# Patient Record
Sex: FEMALE | Race: BLACK OR AFRICAN AMERICAN | NOT HISPANIC OR LATINO | Employment: UNEMPLOYED | URBAN - METROPOLITAN AREA
[De-identification: names, ages, dates, MRNs, and addresses within clinical notes are randomized per-mention and may not be internally consistent; named-entity substitution may affect disease eponyms.]

---

## 2019-05-19 ENCOUNTER — HOSPITAL ENCOUNTER (INPATIENT)
Facility: HOSPITAL | Age: 39
LOS: 3 days | Discharge: HOME | DRG: 519 | End: 2019-05-22
Attending: EMERGENCY MEDICINE | Admitting: NEUROLOGICAL SURGERY
Payer: MEDICAID

## 2019-05-19 ENCOUNTER — ANESTHESIA (OUTPATIENT)
Dept: SURGERY | Facility: HOSPITAL | Age: 39
DRG: 519 | End: 2019-05-19
Payer: MEDICAID

## 2019-05-19 ENCOUNTER — ANESTHESIA EVENT (OUTPATIENT)
Dept: SURGERY | Facility: HOSPITAL | Age: 39
DRG: 519 | End: 2019-05-19
Payer: MEDICAID

## 2019-05-19 DIAGNOSIS — I95.9 HYPOTENSION, UNSPECIFIED HYPOTENSION TYPE: ICD-10-CM

## 2019-05-19 DIAGNOSIS — R94.4 KIDNEY FUNCTION TEST ABNORMAL: ICD-10-CM

## 2019-05-19 DIAGNOSIS — G83.4 CAUDA EQUINA SYNDROME: Primary | ICD-10-CM

## 2019-05-19 DIAGNOSIS — R33.9 URINARY RETENTION: ICD-10-CM

## 2019-05-19 LAB
ABO + RH BLD: NORMAL
ALBUMIN SERPL BCP-MCNC: 3.1 G/DL (ref 3.5–5.2)
ALP SERPL-CCNC: 45 U/L (ref 55–135)
ALT SERPL W/O P-5'-P-CCNC: 8 U/L (ref 10–44)
ANION GAP SERPL CALC-SCNC: 11 MMOL/L (ref 8–16)
APTT BLDCRRT: 23.6 SEC (ref 21–32)
AST SERPL-CCNC: 17 U/L (ref 10–40)
BASOPHILS # BLD AUTO: 0.01 K/UL (ref 0–0.2)
BASOPHILS NFR BLD: 0.2 % (ref 0–1.9)
BILIRUB SERPL-MCNC: 0.2 MG/DL (ref 0.1–1)
BLD GP AB SCN CELLS X3 SERPL QL: NORMAL
BUN SERPL-MCNC: 36 MG/DL (ref 6–20)
CALCIUM SERPL-MCNC: 9 MG/DL (ref 8.7–10.5)
CHLORIDE SERPL-SCNC: 106 MMOL/L (ref 95–110)
CO2 SERPL-SCNC: 21 MMOL/L (ref 23–29)
CREAT SERPL-MCNC: 2.6 MG/DL (ref 0.5–1.4)
DIFFERENTIAL METHOD: ABNORMAL
EOSINOPHIL # BLD AUTO: 0 K/UL (ref 0–0.5)
EOSINOPHIL NFR BLD: 0.2 % (ref 0–8)
ERYTHROCYTE [DISTWIDTH] IN BLOOD BY AUTOMATED COUNT: 15.1 % (ref 11.5–14.5)
EST. GFR  (AFRICAN AMERICAN): 26.2 ML/MIN/1.73 M^2
EST. GFR  (NON AFRICAN AMERICAN): 22.7 ML/MIN/1.73 M^2
GLUCOSE SERPL-MCNC: 112 MG/DL (ref 70–110)
HCT VFR BLD AUTO: 40.7 % (ref 37–48.5)
HGB BLD-MCNC: 13.3 G/DL (ref 12–16)
IMM GRANULOCYTES # BLD AUTO: 0.02 K/UL (ref 0–0.04)
IMM GRANULOCYTES NFR BLD AUTO: 0.3 % (ref 0–0.5)
INR PPP: 0.9 (ref 0.8–1.2)
LYMPHOCYTES # BLD AUTO: 0.6 K/UL (ref 1–4.8)
LYMPHOCYTES NFR BLD: 10 % (ref 18–48)
MCH RBC QN AUTO: 29.8 PG (ref 27–31)
MCHC RBC AUTO-ENTMCNC: 32.7 G/DL (ref 32–36)
MCV RBC AUTO: 91 FL (ref 82–98)
MONOCYTES # BLD AUTO: 0.1 K/UL (ref 0.3–1)
MONOCYTES NFR BLD: 1.4 % (ref 4–15)
NEUTROPHILS # BLD AUTO: 5.5 K/UL (ref 1.8–7.7)
NEUTROPHILS NFR BLD: 87.9 % (ref 38–73)
NRBC BLD-RTO: 0 /100 WBC
PLATELET # BLD AUTO: 227 K/UL (ref 150–350)
PMV BLD AUTO: 10.9 FL (ref 9.2–12.9)
POTASSIUM SERPL-SCNC: 4.4 MMOL/L (ref 3.5–5.1)
PROT SERPL-MCNC: 7 G/DL (ref 6–8.4)
PROTHROMBIN TIME: 9.9 SEC (ref 9–12.5)
RBC # BLD AUTO: 4.46 M/UL (ref 4–5.4)
SODIUM SERPL-SCNC: 138 MMOL/L (ref 136–145)
WBC # BLD AUTO: 6.28 K/UL (ref 3.9–12.7)

## 2019-05-19 PROCEDURE — 71000033 HC RECOVERY, INTIAL HOUR: Performed by: NEUROLOGICAL SURGERY

## 2019-05-19 PROCEDURE — 86901 BLOOD TYPING SEROLOGIC RH(D): CPT

## 2019-05-19 PROCEDURE — 85025 COMPLETE CBC W/AUTO DIFF WBC: CPT

## 2019-05-19 PROCEDURE — 85610 PROTHROMBIN TIME: CPT

## 2019-05-19 PROCEDURE — 36000711: Performed by: NEUROLOGICAL SURGERY

## 2019-05-19 PROCEDURE — 99222 PR INITIAL HOSPITAL CARE,LEVL II: ICD-10-PCS | Mod: 57,,, | Performed by: NEUROLOGICAL SURGERY

## 2019-05-19 PROCEDURE — 99284 EMERGENCY DEPT VISIT MOD MDM: CPT | Mod: ,,, | Performed by: EMERGENCY MEDICINE

## 2019-05-19 PROCEDURE — 99284 PR EMERGENCY DEPT VISIT,LEVEL IV: ICD-10-PCS | Mod: ,,, | Performed by: EMERGENCY MEDICINE

## 2019-05-19 PROCEDURE — 12000002 HC ACUTE/MED SURGE SEMI-PRIVATE ROOM

## 2019-05-19 PROCEDURE — C1729 CATH, DRAINAGE: HCPCS | Performed by: NEUROLOGICAL SURGERY

## 2019-05-19 PROCEDURE — 37000008 HC ANESTHESIA 1ST 15 MINUTES: Performed by: NEUROLOGICAL SURGERY

## 2019-05-19 PROCEDURE — 63030 PR LAMINOTOMY,LUMBAR DISK,1 INTRSP: ICD-10-PCS | Mod: 22,RT,, | Performed by: NEUROLOGICAL SURGERY

## 2019-05-19 PROCEDURE — 36000710: Performed by: NEUROLOGICAL SURGERY

## 2019-05-19 PROCEDURE — D9220A PRA ANESTHESIA: Mod: CRNA,,, | Performed by: NURSE ANESTHETIST, CERTIFIED REGISTERED

## 2019-05-19 PROCEDURE — 25000003 PHARM REV CODE 250: Performed by: NURSE ANESTHETIST, CERTIFIED REGISTERED

## 2019-05-19 PROCEDURE — D9220A PRA ANESTHESIA: Mod: ANES,,, | Performed by: ANESTHESIOLOGY

## 2019-05-19 PROCEDURE — 63600175 PHARM REV CODE 636 W HCPCS: Performed by: NURSE ANESTHETIST, CERTIFIED REGISTERED

## 2019-05-19 PROCEDURE — D9220A PRA ANESTHESIA: ICD-10-PCS | Mod: CRNA,,, | Performed by: NURSE ANESTHETIST, CERTIFIED REGISTERED

## 2019-05-19 PROCEDURE — 96374 THER/PROPH/DIAG INJ IV PUSH: CPT

## 2019-05-19 PROCEDURE — 99222 1ST HOSP IP/OBS MODERATE 55: CPT | Mod: 57,,, | Performed by: NEUROLOGICAL SURGERY

## 2019-05-19 PROCEDURE — 99285 EMERGENCY DEPT VISIT HI MDM: CPT

## 2019-05-19 PROCEDURE — C1762 CONN TISS, HUMAN(INC FASCIA): HCPCS | Performed by: NEUROLOGICAL SURGERY

## 2019-05-19 PROCEDURE — 63600175 PHARM REV CODE 636 W HCPCS: Performed by: STUDENT IN AN ORGANIZED HEALTH CARE EDUCATION/TRAINING PROGRAM

## 2019-05-19 PROCEDURE — 25000003 PHARM REV CODE 250: Performed by: NEUROLOGICAL SURGERY

## 2019-05-19 PROCEDURE — 37000009 HC ANESTHESIA EA ADD 15 MINS: Performed by: NEUROLOGICAL SURGERY

## 2019-05-19 PROCEDURE — 71000039 HC RECOVERY, EACH ADD'L HOUR: Performed by: NEUROLOGICAL SURGERY

## 2019-05-19 PROCEDURE — 63600175 PHARM REV CODE 636 W HCPCS

## 2019-05-19 PROCEDURE — D9220A PRA ANESTHESIA: ICD-10-PCS | Mod: ANES,,, | Performed by: ANESTHESIOLOGY

## 2019-05-19 PROCEDURE — 27201423 OPTIME MED/SURG SUP & DEVICES STERILE SUPPLY: Performed by: NEUROLOGICAL SURGERY

## 2019-05-19 PROCEDURE — 85730 THROMBOPLASTIN TIME PARTIAL: CPT

## 2019-05-19 PROCEDURE — 63600175 PHARM REV CODE 636 W HCPCS: Performed by: NEUROLOGICAL SURGERY

## 2019-05-19 PROCEDURE — 80053 COMPREHEN METABOLIC PANEL: CPT

## 2019-05-19 PROCEDURE — 25000003 PHARM REV CODE 250: Performed by: STUDENT IN AN ORGANIZED HEALTH CARE EDUCATION/TRAINING PROGRAM

## 2019-05-19 PROCEDURE — 63030 LAMOT DCMPRN NRV RT 1 LMBR: CPT | Mod: 22,RT,, | Performed by: NEUROLOGICAL SURGERY

## 2019-05-19 DEVICE — DURA MATRIX ONLAY PLUS 2X2: Type: IMPLANTABLE DEVICE | Site: BACK | Status: FUNCTIONAL

## 2019-05-19 RX ORDER — BISACODYL 10 MG
10 SUPPOSITORY, RECTAL RECTAL DAILY
Status: DISCONTINUED | OUTPATIENT
Start: 2019-05-20 | End: 2019-05-22 | Stop reason: HOSPADM

## 2019-05-19 RX ORDER — DIAZEPAM 5 MG/1
5 TABLET ORAL EVERY 6 HOURS PRN
Status: DISCONTINUED | OUTPATIENT
Start: 2019-05-20 | End: 2019-05-22 | Stop reason: HOSPADM

## 2019-05-19 RX ORDER — MORPHINE SULFATE 2 MG/ML
2 INJECTION, SOLUTION INTRAMUSCULAR; INTRAVENOUS ONCE
Status: COMPLETED | OUTPATIENT
Start: 2019-05-19 | End: 2019-05-19

## 2019-05-19 RX ORDER — FENTANYL CITRATE 50 UG/ML
INJECTION, SOLUTION INTRAMUSCULAR; INTRAVENOUS
Status: DISCONTINUED | OUTPATIENT
Start: 2019-05-19 | End: 2019-05-19

## 2019-05-19 RX ORDER — PROPOFOL 10 MG/ML
VIAL (ML) INTRAVENOUS
Status: DISCONTINUED | OUTPATIENT
Start: 2019-05-19 | End: 2019-05-19

## 2019-05-19 RX ORDER — LIDOCAINE HCL/PF 100 MG/5ML
SYRINGE (ML) INTRAVENOUS
Status: DISCONTINUED | OUTPATIENT
Start: 2019-05-19 | End: 2019-05-19

## 2019-05-19 RX ORDER — LIDOCAINE HYDROCHLORIDE AND EPINEPHRINE 10; 10 MG/ML; UG/ML
INJECTION, SOLUTION INFILTRATION; PERINEURAL
Status: DISCONTINUED | OUTPATIENT
Start: 2019-05-19 | End: 2019-05-19 | Stop reason: HOSPADM

## 2019-05-19 RX ORDER — BACITRACIN ZINC 500 UNIT/G
OINTMENT (GRAM) TOPICAL
Status: DISCONTINUED | OUTPATIENT
Start: 2019-05-19 | End: 2019-05-19 | Stop reason: HOSPADM

## 2019-05-19 RX ORDER — SUCCINYLCHOLINE CHLORIDE 20 MG/ML
INJECTION INTRAMUSCULAR; INTRAVENOUS
Status: DISCONTINUED | OUTPATIENT
Start: 2019-05-19 | End: 2019-05-19

## 2019-05-19 RX ORDER — BACITRACIN 50000 [IU]/1
INJECTION, POWDER, FOR SOLUTION INTRAMUSCULAR
Status: DISCONTINUED | OUTPATIENT
Start: 2019-05-19 | End: 2019-05-19 | Stop reason: HOSPADM

## 2019-05-19 RX ORDER — METHYLPREDNISOLONE 4 MG/1
4 TABLET ORAL
COMMUNITY
End: 2020-07-30 | Stop reason: CLARIF

## 2019-05-19 RX ORDER — NAPROXEN 500 MG/1
500 TABLET ORAL 2 TIMES DAILY
COMMUNITY
End: 2020-07-30 | Stop reason: CLARIF

## 2019-05-19 RX ORDER — INSULIN ASPART 100 [IU]/ML
1-10 INJECTION, SOLUTION INTRAVENOUS; SUBCUTANEOUS
Status: DISCONTINUED | OUTPATIENT
Start: 2019-05-19 | End: 2019-05-22 | Stop reason: HOSPADM

## 2019-05-19 RX ORDER — HYDROMORPHONE HYDROCHLORIDE 1 MG/ML
0.5 INJECTION, SOLUTION INTRAMUSCULAR; INTRAVENOUS; SUBCUTANEOUS EVERY 5 MIN PRN
Status: DISCONTINUED | OUTPATIENT
Start: 2019-05-19 | End: 2019-05-20 | Stop reason: HOSPADM

## 2019-05-19 RX ORDER — ONDANSETRON 2 MG/ML
4 INJECTION INTRAMUSCULAR; INTRAVENOUS DAILY PRN
Status: DISCONTINUED | OUTPATIENT
Start: 2019-05-19 | End: 2019-05-20 | Stop reason: HOSPADM

## 2019-05-19 RX ORDER — ONDANSETRON 2 MG/ML
INJECTION INTRAMUSCULAR; INTRAVENOUS
Status: DISCONTINUED | OUTPATIENT
Start: 2019-05-19 | End: 2019-05-19

## 2019-05-19 RX ORDER — HYDROCODONE BITARTRATE AND ACETAMINOPHEN 10; 325 MG/1; MG/1
1 TABLET ORAL EVERY 4 HOURS PRN
Status: DISCONTINUED | OUTPATIENT
Start: 2019-05-20 | End: 2019-05-22 | Stop reason: HOSPADM

## 2019-05-19 RX ORDER — HYDROCODONE BITARTRATE AND ACETAMINOPHEN 7.5; 325 MG/1; MG/1
1 TABLET ORAL EVERY 6 HOURS PRN
Status: ON HOLD | COMMUNITY
End: 2019-05-22 | Stop reason: HOSPADM

## 2019-05-19 RX ORDER — IBUPROFEN 200 MG
24 TABLET ORAL
Status: DISCONTINUED | OUTPATIENT
Start: 2019-05-19 | End: 2019-05-22 | Stop reason: HOSPADM

## 2019-05-19 RX ORDER — VANCOMYCIN HYDROCHLORIDE 1 G/20ML
INJECTION, POWDER, LYOPHILIZED, FOR SOLUTION INTRAVENOUS
Status: DISCONTINUED | OUTPATIENT
Start: 2019-05-19 | End: 2019-05-19 | Stop reason: HOSPADM

## 2019-05-19 RX ORDER — MELOXICAM 15 MG/1
15 TABLET ORAL DAILY
COMMUNITY
End: 2020-07-30 | Stop reason: CLARIF

## 2019-05-19 RX ORDER — MIDAZOLAM HYDROCHLORIDE 1 MG/ML
INJECTION, SOLUTION INTRAMUSCULAR; INTRAVENOUS
Status: DISCONTINUED | OUTPATIENT
Start: 2019-05-19 | End: 2019-05-19

## 2019-05-19 RX ORDER — HYDROMORPHONE HYDROCHLORIDE 1 MG/ML
0.2 INJECTION, SOLUTION INTRAMUSCULAR; INTRAVENOUS; SUBCUTANEOUS EVERY 5 MIN PRN
Status: DISCONTINUED | OUTPATIENT
Start: 2019-05-19 | End: 2019-05-20 | Stop reason: HOSPADM

## 2019-05-19 RX ORDER — ACETAMINOPHEN 10 MG/ML
INJECTION, SOLUTION INTRAVENOUS
Status: DISCONTINUED | OUTPATIENT
Start: 2019-05-19 | End: 2019-05-19

## 2019-05-19 RX ORDER — HEPARIN SODIUM 5000 [USP'U]/ML
5000 INJECTION, SOLUTION INTRAVENOUS; SUBCUTANEOUS EVERY 8 HOURS
Status: DISCONTINUED | OUTPATIENT
Start: 2019-05-20 | End: 2019-05-22 | Stop reason: HOSPADM

## 2019-05-19 RX ORDER — CYCLOBENZAPRINE HCL 10 MG
10 TABLET ORAL 3 TIMES DAILY PRN
COMMUNITY
End: 2020-07-30 | Stop reason: CLARIF

## 2019-05-19 RX ORDER — ROCURONIUM BROMIDE 10 MG/ML
INJECTION, SOLUTION INTRAVENOUS
Status: DISCONTINUED | OUTPATIENT
Start: 2019-05-19 | End: 2019-05-19

## 2019-05-19 RX ORDER — CEFAZOLIN SODIUM 1 G/3ML
2 INJECTION, POWDER, FOR SOLUTION INTRAMUSCULAR; INTRAVENOUS
Status: COMPLETED | OUTPATIENT
Start: 2019-05-20 | End: 2019-05-20

## 2019-05-19 RX ORDER — IBUPROFEN 200 MG
16 TABLET ORAL
Status: DISCONTINUED | OUTPATIENT
Start: 2019-05-19 | End: 2019-05-22 | Stop reason: HOSPADM

## 2019-05-19 RX ORDER — GLUCAGON 1 MG
1 KIT INJECTION
Status: DISCONTINUED | OUTPATIENT
Start: 2019-05-19 | End: 2019-05-22 | Stop reason: HOSPADM

## 2019-05-19 RX ORDER — TAMSULOSIN HYDROCHLORIDE 0.4 MG/1
0.4 CAPSULE ORAL DAILY
Status: DISCONTINUED | OUTPATIENT
Start: 2019-05-20 | End: 2019-05-22 | Stop reason: HOSPADM

## 2019-05-19 RX ORDER — SODIUM CHLORIDE 0.9 % (FLUSH) 0.9 %
3 SYRINGE (ML) INJECTION
Status: DISCONTINUED | OUTPATIENT
Start: 2019-05-19 | End: 2019-05-22 | Stop reason: HOSPADM

## 2019-05-19 RX ORDER — MAG HYDROX/ALUMINUM HYD/SIMETH 200-200-20
30 SUSPENSION, ORAL (FINAL DOSE FORM) ORAL EVERY 4 HOURS PRN
Status: DISCONTINUED | OUTPATIENT
Start: 2019-05-20 | End: 2019-05-22 | Stop reason: HOSPADM

## 2019-05-19 RX ORDER — HYDROMORPHONE HYDROCHLORIDE 1 MG/ML
INJECTION, SOLUTION INTRAMUSCULAR; INTRAVENOUS; SUBCUTANEOUS
Status: COMPLETED
Start: 2019-05-19 | End: 2019-05-19

## 2019-05-19 RX ORDER — DEXAMETHASONE SODIUM PHOSPHATE 4 MG/ML
INJECTION, SOLUTION INTRA-ARTICULAR; INTRALESIONAL; INTRAMUSCULAR; INTRAVENOUS; SOFT TISSUE
Status: DISCONTINUED | OUTPATIENT
Start: 2019-05-19 | End: 2019-05-19

## 2019-05-19 RX ORDER — AMOXICILLIN 250 MG
2 CAPSULE ORAL NIGHTLY PRN
Status: DISCONTINUED | OUTPATIENT
Start: 2019-05-20 | End: 2019-05-22 | Stop reason: HOSPADM

## 2019-05-19 RX ORDER — MORPHINE SULFATE 2 MG/ML
2 INJECTION, SOLUTION INTRAMUSCULAR; INTRAVENOUS
Status: DISCONTINUED | OUTPATIENT
Start: 2019-05-20 | End: 2019-05-22 | Stop reason: HOSPADM

## 2019-05-19 RX ORDER — ACETAMINOPHEN 325 MG/1
650 TABLET ORAL EVERY 6 HOURS PRN
Status: DISCONTINUED | OUTPATIENT
Start: 2019-05-20 | End: 2019-05-22 | Stop reason: HOSPADM

## 2019-05-19 RX ORDER — MUPIROCIN 20 MG/G
1 OINTMENT TOPICAL 2 TIMES DAILY
Status: DISCONTINUED | OUTPATIENT
Start: 2019-05-20 | End: 2019-05-22 | Stop reason: HOSPADM

## 2019-05-19 RX ORDER — ONDANSETRON 8 MG/1
8 TABLET, ORALLY DISINTEGRATING ORAL EVERY 6 HOURS PRN
Status: DISCONTINUED | OUTPATIENT
Start: 2019-05-20 | End: 2019-05-22 | Stop reason: HOSPADM

## 2019-05-19 RX ORDER — KETAMINE HYDROCHLORIDE 10 MG/ML
INJECTION, SOLUTION INTRAMUSCULAR; INTRAVENOUS
Status: DISCONTINUED | OUTPATIENT
Start: 2019-05-19 | End: 2019-05-19

## 2019-05-19 RX ADMIN — DIAZEPAM 5 MG: 5 TABLET ORAL at 11:05

## 2019-05-19 RX ADMIN — SODIUM CHLORIDE, SODIUM GLUCONATE, SODIUM ACETATE, POTASSIUM CHLORIDE, MAGNESIUM CHLORIDE, SODIUM PHOSPHATE, DIBASIC, AND POTASSIUM PHOSPHATE: .53; .5; .37; .037; .03; .012; .00082 INJECTION, SOLUTION INTRAVENOUS at 09:05

## 2019-05-19 RX ADMIN — FENTANYL CITRATE 50 MCG: 50 INJECTION, SOLUTION INTRAMUSCULAR; INTRAVENOUS at 09:05

## 2019-05-19 RX ADMIN — PROPOFOL 180 MG: 10 INJECTION, EMULSION INTRAVENOUS at 07:05

## 2019-05-19 RX ADMIN — LIDOCAINE HYDROCHLORIDE 100 MG: 20 INJECTION, SOLUTION INTRAVENOUS at 07:05

## 2019-05-19 RX ADMIN — CEFTRIAXONE 2 G: 1 INJECTION, SOLUTION INTRAVENOUS at 08:05

## 2019-05-19 RX ADMIN — ONDANSETRON 4 MG: 2 INJECTION INTRAMUSCULAR; INTRAVENOUS at 11:05

## 2019-05-19 RX ADMIN — HYDROMORPHONE HYDROCHLORIDE 0.2 MG: 1 INJECTION, SOLUTION INTRAMUSCULAR; INTRAVENOUS; SUBCUTANEOUS at 11:05

## 2019-05-19 RX ADMIN — KETAMINE HYDROCHLORIDE 25 MG: 10 INJECTION, SOLUTION INTRAMUSCULAR; INTRAVENOUS at 07:05

## 2019-05-19 RX ADMIN — MIDAZOLAM HYDROCHLORIDE 2 MG: 1 INJECTION, SOLUTION INTRAMUSCULAR; INTRAVENOUS at 07:05

## 2019-05-19 RX ADMIN — KETAMINE HYDROCHLORIDE 15 MG: 10 INJECTION, SOLUTION INTRAMUSCULAR; INTRAVENOUS at 08:05

## 2019-05-19 RX ADMIN — HYDROCODONE BITARTRATE AND ACETAMINOPHEN 1 TABLET: 10; 325 TABLET ORAL at 11:05

## 2019-05-19 RX ADMIN — FENTANYL CITRATE 50 MCG: 50 INJECTION, SOLUTION INTRAMUSCULAR; INTRAVENOUS at 11:05

## 2019-05-19 RX ADMIN — SUCCINYLCHOLINE CHLORIDE 120 MG: 20 INJECTION, SOLUTION INTRAMUSCULAR; INTRAVENOUS at 07:05

## 2019-05-19 RX ADMIN — METHOCARBAMOL 500 MG: 100 INJECTION INTRAMUSCULAR; INTRAVENOUS at 08:05

## 2019-05-19 RX ADMIN — MORPHINE SULFATE 2 MG: 2 INJECTION, SOLUTION INTRAMUSCULAR; INTRAVENOUS at 06:05

## 2019-05-19 RX ADMIN — ACETAMINOPHEN 1000 MG: 10 INJECTION, SOLUTION INTRAVENOUS at 08:05

## 2019-05-19 RX ADMIN — ROCURONIUM BROMIDE 5 MG: 10 INJECTION, SOLUTION INTRAVENOUS at 07:05

## 2019-05-19 RX ADMIN — FENTANYL CITRATE 50 MCG: 50 INJECTION, SOLUTION INTRAMUSCULAR; INTRAVENOUS at 08:05

## 2019-05-19 RX ADMIN — DEXAMETHASONE SODIUM PHOSPHATE 12 MG: 4 INJECTION, SOLUTION INTRAMUSCULAR; INTRAVENOUS at 07:05

## 2019-05-19 RX ADMIN — KETAMINE HYDROCHLORIDE 10 MG: 10 INJECTION, SOLUTION INTRAMUSCULAR; INTRAVENOUS at 09:05

## 2019-05-19 RX ADMIN — SODIUM CHLORIDE, SODIUM GLUCONATE, SODIUM ACETATE, POTASSIUM CHLORIDE, MAGNESIUM CHLORIDE, SODIUM PHOSPHATE, DIBASIC, AND POTASSIUM PHOSPHATE: .53; .5; .37; .037; .03; .012; .00082 INJECTION, SOLUTION INTRAVENOUS at 07:05

## 2019-05-19 RX ADMIN — PROPOFOL 20 MG: 10 INJECTION, EMULSION INTRAVENOUS at 07:05

## 2019-05-19 RX ADMIN — FENTANYL CITRATE 100 MCG: 50 INJECTION, SOLUTION INTRAMUSCULAR; INTRAVENOUS at 07:05

## 2019-05-19 NOTE — ED NOTES
"Pt informed of need for rectal exam. Rectal exam performed by Dr. Velásquez. Decreased rectal tone noted. Pt was unaware of rectal exam completion. Informed pt, exam is finished. Pt states " I didn't even feel it."   "

## 2019-05-19 NOTE — ANESTHESIA PREPROCEDURE EVALUATION
[facility]  Anesthesia Pre-Operative Evaluation         Patient Name: Leti Babb  YOB: 1980  MRN: [id number]    SUBJECTIVE:     Pre-operative evaluation for Procedure(s) (LRB):  LAMINECTOMY, SPINE, LUMBAR, WITH FUSION (L5/S1) MIS laminectomy Microscope, metrix set (do not open) Class A (N/A)     05/19/2019    Leti Babb is a 39 y.o. female w/ significant PMHx of tobacco abuse, presented with saddle anesthesia after fall. CT of lumbar spine showing left central disprotrusion of L5-S1. Last ate at noon, had shrimp etoufee.    Patient now presents for the above procedure(s).      LDA:   22 g Lt AC      Prev airway: None documented    Drips: None documented.      There is no problem list on file for this patient.      Review of patient's allergies indicates:  No Known Allergies    Current Inpatient Medications:   methocarbamol (ROBAXIN) IVPB  500 mg Intravenous Once       No current facility-administered medications on file prior to encounter.      Current Outpatient Medications on File Prior to Encounter   Medication Sig Dispense Refill    cyclobenzaprine (FLEXERIL) 10 MG tablet Take 10 mg by mouth 3 (three) times daily as needed for Muscle spasms.      HYDROcodone-acetaminophen (NORCO) 7.5-325 mg per tablet Take 1 tablet by mouth every 6 (six) hours as needed for Pain.      meloxicam (MOBIC) 15 MG tablet Take 15 mg by mouth once daily.      methylPREDNISolone (MEDROL DOSEPACK) 4 mg tablet Take 4 mg by mouth. use as directed      naproxen (NAPROSYN) 500 MG tablet Take 500 mg by mouth 2 (two) times daily.         History reviewed. No pertinent surgical history.    Social History     Socioeconomic History    Marital status: Single     Spouse name: Not on file    Number of children: Not on file    Years of education: Not on file    Highest education level: Not on file   Occupational History    Not on file   Social Needs    Financial resource strain: Not on file     Food insecurity:     Worry: Not on file     Inability: Not on file    Transportation needs:     Medical: Not on file     Non-medical: Not on file   Tobacco Use    Smoking status: Current Every Day Smoker     Packs/day: 0.50     Types: Cigarettes   Substance and Sexual Activity    Alcohol use: Not Currently    Drug use: Not on file    Sexual activity: Not on file   Lifestyle    Physical activity:     Days per week: Not on file     Minutes per session: Not on file    Stress: Not on file   Relationships    Social connections:     Talks on phone: Not on file     Gets together: Not on file     Attends [employer] service: Not on file     Active member of club or organization: Not on file     Attends meetings of clubs or organizations: Not on file     Relationship status: Not on file   Other Topics Concern    Not on file   Social History Narrative    Not on file       OBJECTIVE:     Vital Signs Range (Last 24H):  Temp:  [36.7 °C (98.1 °F)-36.8 °C (98.2 °F)]   Pulse:  [72-86]   Resp:  [14-16]   BP: (107-136)/(51-63)   SpO2:  [99 %-100 %]       Significant Labs:  Lab Results   Component Value Date    WBC 6.28 05/19/2019    HGB 13.3 05/19/2019    HCT 40.7 05/19/2019     05/19/2019    INR 0.9 05/19/2019       Diagnostic Studies:   MRI spine   Impression       1. Large disc protrusion at L5-S1 resulting in severe spinal canal stenosis.  Correlation is advised noting there is limited evaluation secondary to motion artifact.  2. Additional spinal degenerative changes as described above.         EKG: No recent studies available.    2D ECHO:  No results found for this or any previous visit.      ASSESSMENT/PLAN:         Anesthesia Evaluation    I have reviewed the Patient Summary Reports.    I have reviewed the Nursing Notes.   I have reviewed the Medications.     Review of Systems  Anesthesia Hx:  Neg history of prior surgery. Denies Family Hx of Anesthesia complications.   Denies Personal Hx of Anesthesia  complications.   Social:  Smoker    Cardiovascular:   Denies MI.  Denies CAD.    Denies CABG/stent.  Denies Dysrhythmias.   Denies Angina.    Pulmonary:   Denies Pneumonia Denies COPD.  Denies Asthma.  Denies Shortness of breath.    Renal/:   Denies Chronic Renal Disease.     Hepatic/GI:   Denies Liver Disease.    Neurological:   Denies CVA. Denies Seizures. Cauda equina       Physical Exam  General:  Well nourished    Airway/Jaw/Neck:  Airway Findings: Mouth Opening: Normal Tongue: Normal  Mallampati: II  Improves to I with phonation.  TM Distance: Normal, at least 6 cm        Eyes/Ears/Nose:  EYES/EARS/NOSE FINDINGS: Normal   Dental:  DENTAL FINDINGS: Normal   Chest/Lungs:  Chest/Lungs Findings: Clear to auscultation, Normal Respiratory Rate     Heart/Vascular:  Heart Findings: Rate: Normal  Rhythm: Regular Rhythm  Sounds: Normal     Abdomen:  Abdomen Findings: Normal    Musculoskeletal:  Musculoskeletal Findings: Normal   Skin:  Skin Findings: Normal    Mental Status:  Mental Status Findings: Normal        Anesthesia Plan  Type of Anesthesia, risks & benefits discussed:  Anesthesia Type:  general  Patient's Preference:   Intra-op Monitoring Plan: standard ASA monitors  Intra-op Monitoring Plan Comments:   Post Op Pain Control Plan: per primary service following discharge from PACU  Post Op Pain Control Plan Comments:   Induction:   IV  Beta Blocker:  Patient is not currently on a Beta-Blocker (No further documentation required).       Informed Consent: Patient understands risks and agrees with Anesthesia plan.  Questions answered. Anesthesia consent signed with patient.  ASA Score: 3  emergent   Day of Surgery Review of History & Physical: I have interviewed and examined the patient. I have reviewed the patient's H&P dated:  There are no significant changes.  H&P update referred to the surgeon.         Ready For Surgery From Anesthesia Perspective.

## 2019-05-19 NOTE — ED TRIAGE NOTES
"Pt arrived via [facility] from [facility]. Pt was sent here for neuro eval. Pt slipped and fell on soap in bathroom last Thursday. Pt has L5-S1 disc protrusion. Pt has not had bowel movement in 2 weeks. Pt states I am not regular. Last night patient had trouble urinating. Pt was found to have urinary retention. Nash placed at [facility]. Pt states " I couldn't feel it when they put it in or right now. I'm numb down there." Denies pain at this time. Pt able to walk without assistance.   "

## 2019-05-19 NOTE — ED PROVIDER NOTES
Encounter Date: 5/19/2019    SCRIBE #1 NOTE: I, Francisco Sheth, am scribing for, and in the presence of,  Dr. Velásquez. I have scribed the entire note.       History     Chief Complaint   Patient presents with    Extremity Weakness     Weakness and numbness to lower extremities. Pt sent from [facility] for neuro evaluation.     Patient is a transfer from [facility]. Was evaluated this morning complaining of saddle anesthesia and urinary retention. She had a history of sciatica and then had fallen in the bathtub one week prior to presentation. She had a padgett catheter placed and about 1300 cc's of output. Was given a liter of fluids and decadron IV. Had a normal lower extermity neuro except for mild decreased sensation around her upelvis. CT of lumbar spine showing left central disprotrusion of L5-S1, likely compressing the left S1 nerve root. Transfered here given concern for cauda equina for MRI L-spine and evaluation by neurosurgery.     The history is provided by the EMS personnel.     Review of patient's allergies indicates:  No Known Allergies  History reviewed. No pertinent past medical history.  History reviewed. No pertinent surgical history.  History reviewed. No pertinent family history.  Social History     Tobacco Use    Smoking status: Current Every Day Smoker     Packs/day: 0.50     Types: Cigarettes   Substance Use Topics    Alcohol use: Not Currently    Drug use: Not on file     Review of Systems    Physical Exam     Initial Vitals [05/19/19 1540]   BP Pulse Resp Temp SpO2   136/63 86 16 98.1 °F (36.7 °C) 99 %      MAP       --         Physical Exam    Nursing note and vitals reviewed.  Constitutional: She appears well-developed and well-nourished. No distress.   HENT:   Head: Normocephalic and atraumatic.   Nose: Nose normal.   Eyes: Conjunctivae and EOM are normal. Pupils are equal, round, and reactive to light.   Neck: Normal range of motion. Neck supple.   Cardiovascular: Normal rate  and regular rhythm.   Pulmonary/Chest: Breath sounds normal. No respiratory distress.   Abdominal: Soft. Bowel sounds are normal. She exhibits no distension.   Genitourinary: Rectal exam shows anal tone abnormal.   Musculoskeletal: She exhibits no edema.   Neurological: She is alert and oriented to person, place, and time. No sensory deficit.   Strength 5/5 on lower extremities.   Absent sensation in the perineum  Poor rectal tone   Skin: Skin is warm and dry. Capillary refill takes less than 2 seconds. No rash noted.         ED Course   Procedures  Labs Reviewed   CBC W/ AUTO DIFFERENTIAL - Abnormal; Notable for the following components:       Result Value    RDW 15.1 (*)     Lymph # 0.6 (*)     Mono # 0.1 (*)     Gran% 87.9 (*)     Lymph% 10.0 (*)     Mono% 1.4 (*)     All other components within normal limits   COMPREHENSIVE METABOLIC PANEL - Abnormal; Notable for the following components:    CO2 21 (*)     Glucose 112 (*)     BUN, Bld 36 (*)     Creatinine 2.6 (*)     Albumin 3.1 (*)     Alkaline Phosphatase 45 (*)     ALT 8 (*)     eGFR if  26.2 (*)     eGFR if non  22.7 (*)     All other components within normal limits    Narrative:     ADD ON CMP. PER LIANNA WALLACE MD.  05/19/2019  18:53    PROTIME-INR   APTT   COMPREHENSIVE METABOLIC PANEL   TYPE & SCREEN          Imaging Results          MRI Lumbar Spine Without Contrast (Final result)  Result time 05/19/19 18:28:16    Final result by Herminio Gaona MD (05/19/19 18:28:16)                 Impression:      1. Large disc protrusion at L5-S1 resulting in severe spinal canal stenosis.  Correlation is advised noting there is limited evaluation secondary to motion artifact.  2. Additional spinal degenerative changes as described above.      Electronically signed by: Herminio Gaona MD  Date:    05/19/2019  Time:    18:28             Narrative:    EXAMINATION:  MRI LUMBAR SPINE WITHOUT CONTRAST    CLINICAL HISTORY:  r/o cauda  equina;    TECHNIQUE:  Multiplanar, multisequence MR images were acquired from the thoracolumbar junction to the sacrum without the administration of contrast.    COMPARISON:  None.    FINDINGS:  Exam is somewhat limited secondary to motion artifact.    Allowing for this, sagittal sequences demonstrate grossly adequate alignment of the lumbar spine noting disc space height loss at L5-S1 with disc desiccation.  No abnormal marrow signal that would suggest malignancy or infection.  No significant intradural abnormalities.    Limited evaluation of the abdominopelvic content is grossly unremarkable, for the most part obscured by artifact.    L1-L2: No significant disc bulge, spinal canal stenosis, or neuroforaminal narrowing.    L2-L3: No significant disc bulge, spinal canal stenosis, or neuroforaminal narrowing.    L3-L4: There is a mild broad-based disc bulge, slightly asymmetric to the left resulting in effacement of the anterior CSF sleeve and mild bilateral neuroforaminal narrowing, left greater than right.  There is facet arthropathy.    L4-L5: There is a broad-based disc bulge with mild central protrusion without significant spinal canal stenosis.  There is bilateral mild neuroforaminal narrowing and facet arthropathy.    L5-S1 could there is a large central protrusion resulting in severe spinal canal stenosis and mild right neuroforaminal narrowing.  There is facet arthropathy.  Along the posterior margin of the protrusion, there is question of possible sequestered disc fragment or posterior lobulation of the protrusion.  This is difficult to evaluate given motion artifact.                                 Medical Decision Making:   Initial Assessment:   40 yo f, transfer from OSH for MRI L spine and NSG eval given concern for cauda equina    LE strength/sensation intact  Nash catheter in place  Independently Interpreted Test(s):   I have ordered and independently interpreted X-rays - see prior notes.  Clinical  Tests:   Lab Tests: Ordered and Reviewed  Radiological Study: Ordered and Reviewed  ED Management:  MRI L spine  Have discussed with NSG resident  Anticipate admission            Scribe Attestation:   Scribe #1: I performed the above scribed service and the documentation accurately describes the services I performed. I attest to the accuracy of the note.    I, Dr. Elena Velásquez, personally performed the services described in this documentation. All medical record entries made by the scribe were at my direction and in my presence.  I have reviewed the chart and agree that the record reflects my personal performance and is accurate and complete. Elena Velásquez MD.  4:20 PM 05/20/2019           Clinical Impression:       ICD-10-CM ICD-9-CM   1. Cauda equina syndrome G83.4 344.60                                Elena Velásquez MD  05/20/19 0724

## 2019-05-19 NOTE — SUBJECTIVE & OBJECTIVE
(Not in a hospital admission)    Review of patient's allergies indicates:  No Known Allergies    History reviewed. No pertinent past medical history.  History reviewed. No pertinent surgical history.  Family History     None        Tobacco Use    Smoking status: Current Every Day Smoker     Packs/day: 0.50     Types: Cigarettes   Substance and Sexual Activity    Alcohol use: Not Currently    Drug use: Not on file    Sexual activity: Not on file     Review of Systems   Constitutional: Negative for activity change, chills, fatigue, fever and unexpected weight change.   HENT: Negative for tinnitus and voice change.    Eyes: Negative for photophobia and visual disturbance.   Respiratory: Negative for cough, choking, chest tightness, shortness of breath, wheezing and stridor.    Cardiovascular: Negative for chest pain and palpitations.   Gastrointestinal: Negative for abdominal distention, abdominal pain, constipation, diarrhea, nausea and vomiting.   Endocrine: Negative for polydipsia, polyphagia and polyuria.   Genitourinary: Negative for difficulty urinating, dysuria, frequency, hematuria and urgency.        Urinary retention   Musculoskeletal: Positive for back pain (w/ rdx into BLE). Negative for gait problem and neck stiffness.   Skin: Negative for pallor, rash and wound.   Neurological: Positive for numbness. Negative for dizziness, tremors, seizures, syncope, facial asymmetry, speech difficulty, weakness, light-headedness and headaches.   Psychiatric/Behavioral: Negative for agitation, behavioral problems and confusion.     Objective:     Weight: 83.5 kg (184 lb)  Body mass index is 30.62 kg/m².  Vital Signs (Most Recent):  Temp: 98.2 °F (36.8 °C) (05/19/19 1833)  Pulse: 72 (05/19/19 1833)  Resp: 15 (05/19/19 1833)  BP: (!) 107/51 (05/19/19 1833)  SpO2: 99 % (05/19/19 1833) Vital Signs (24h Range):  Temp:  [98.1 °F (36.7 °C)-98.2 °F (36.8 °C)] 98.2 °F (36.8 °C)  Pulse:  [72-86] 72  Resp:  [14-16] 15  SpO2:   [99 %-100 %] 99 %  BP: (107-136)/(51-63) 107/51                          Urethral Catheter 05/19/19 Latex 16 Fr. (Active)       Neurosurgery Physical Exam  General: AOx3, GCS [id number]  CNII-XII: Intact on fine exam, PERRL, EOMi, facial sensation preserved, no facial assymetry, tongue/uvula/palate midline, shoulder shrug equal, No pronator drift  Extremities:  Motor:  Upper Extremity:                                  Deltoids        Triceps        Biceps        WE        WF                Interosseous           R        5/5              5/5              5/5            5/5         5/5         5/5                5/5           L        5/5              5/5              5/5            5/5         5/5         5/5                5/5  Lower Extremity:                                      HF        KE        KF        DF        PF        EHL           R       5/5        5/5        5/5        5/5        5/5        5/5           L       5/5        5/5        5/5        5/5        5/5        5/5  Rectal tone: Absent    Reflexes:     DTR: 1+ and symmetrically throughout     Jose's: Negative     Babinski's: Negative     Clonus: Negative    Sensory:      Sensorium intact throughout, no sensory level present, Saddle anesthesia noted on exam    Coordination:      Coordination intact throughout    Gross Exam:  Posture: No focal or global spinal deformity visible on inspection.  Bending: Obvious pain with movement precluding ROM activities     Cervical Spine:      Midline TTP: Negative    Thoracic Spine:     Midline TTP: Negative     Lumbar Spine:     Midline TTP: Negative     Paraspinal TTP: Present     Straight leg raise: Negative BLE    Significant Labs:  No results for input(s): GLU, NA, K, CL, CO2, BUN, CREATININE, CALCIUM, MG in the last 48 hours.  Recent Labs   Lab 05/19/19  1608   WBC 6.28   HGB 13.3   HCT 40.7        Recent Labs   Lab 05/19/19  1608   INR 0.9   APTT 23.6     Microbiology Results (last 7 days)      ** No results found for the last 168 hours. **        ABGs: No results for input(s): PH, PCO2, PO2, HCO3, POCSATURATED, BE in the last 48 hours.  Cardiac markers: No results for input(s): CKMB, CPKMB, TROPONINT, TROPONINI, MYOGLOBIN in the last 48 hours.  CMP: No results for input(s): GLU, CALCIUM, ALBUMIN, PROT, NA, K, CO2, CL, BUN, CREATININE, ALKPHOS, ALT, AST, BILITOT in the last 48 hours.  CRP: No results for input(s): CRP in the last 48 hours.  ESR: No results for input(s): POCESR, ERYTHROCYTES in the last 48 hours.  LFTs: No results for input(s): ALT, AST, ALKPHOS, BILITOT, PROT, ALBUMIN in the last 48 hours.  Procalcitonin: No results for input(s): PROCAL in the last 48 hours.    Significant Diagnostics:  I have reviewed all pertinent imaging results/findings within the past 24 hours.

## 2019-05-19 NOTE — HOSPITAL COURSE
5/19: Patient seen at bedside, PRECIOUS diagnosed on H&P and imaging, taken for emergent surgery  5/20: NAEON. Reports incisional back pain. Continued saddle anesthesia. LSO brace in place. Padgett in place. Denies worsening weakness, numbness, paresthesias. Up to chair this morning, tolerated well. Drain output 65cc overnight.   5/21: Hypotensive overnight, asymptomatic. Will continue to monitor. Pain controlled with current regimen. Continued saddle anesthesia. Padgett in place, voiding trial today. Drain output 155cc in past 24 hrs, 80 overnight. Kidney function labs within normal range today, will ctm. PT/OT rec outpatient therapy. Discharge pending drain removal.   5/22: Continued hypotension, asymptomatic. Drain documented 0 overnight, 25cc in drain at 11am. Drain removed today. Continued saddle anesthesia. Pain controlled. Failed voiding trial yesterday and padgett replaced. Will discharge with padgett, instructed to follow-up with Urology within 1 week for voiding trial. Patient given referral and will have to find Urologist as [facility] does not take her insurance. Referral given for OP PT/OT. Will discharge today with pain medication, Flomax, and appropriate referrals.

## 2019-05-19 NOTE — HPI
39F w/ PMH smoking but no other contributory medical conditions who presents from OSH w/ cauda equina syndrome from L5/S1 HNP. Patient states that she has had LBP radiating into her BLE to the knee since December. Last week she fell on her back in the bathtub and her pain has been progressively worse since. Yesterday she realized that she was having difficulty with urination, abdominal pain and was unable to control her voiding. She presented to OSH where she was evaluated and a padgett was placed w/ 1500mL of urine returned. She was [place] [facility] [place] MRI shows L5/S1 HNP. Additionally, she describes saddle anesthesia. ROS is otherwise negative.

## 2019-05-20 LAB
ANION GAP SERPL CALC-SCNC: 12 MMOL/L (ref 8–16)
ANISOCYTOSIS BLD QL SMEAR: SLIGHT
BASOPHILS # BLD AUTO: 0.01 K/UL (ref 0–0.2)
BASOPHILS NFR BLD: 0.1 % (ref 0–1.9)
BUN SERPL-MCNC: 24 MG/DL (ref 6–20)
CALCIUM SERPL-MCNC: 8.8 MG/DL (ref 8.7–10.5)
CHLORIDE SERPL-SCNC: 104 MMOL/L (ref 95–110)
CO2 SERPL-SCNC: 20 MMOL/L (ref 23–29)
CREAT SERPL-MCNC: 1.4 MG/DL (ref 0.5–1.4)
DACRYOCYTES BLD QL SMEAR: ABNORMAL
DIFFERENTIAL METHOD: ABNORMAL
EOSINOPHIL # BLD AUTO: 0 K/UL (ref 0–0.5)
EOSINOPHIL NFR BLD: 0 % (ref 0–8)
ERYTHROCYTE [DISTWIDTH] IN BLOOD BY AUTOMATED COUNT: 14.9 % (ref 11.5–14.5)
EST. GFR  (AFRICAN AMERICAN): 55.4 ML/MIN/1.73 M^2
EST. GFR  (NON AFRICAN AMERICAN): 48 ML/MIN/1.73 M^2
GLUCOSE SERPL-MCNC: 118 MG/DL (ref 70–110)
HCT VFR BLD AUTO: 40.4 % (ref 37–48.5)
HGB BLD-MCNC: 14.1 G/DL (ref 12–16)
HYPOCHROMIA BLD QL SMEAR: ABNORMAL
IMM GRANULOCYTES # BLD AUTO: 0.07 K/UL (ref 0–0.04)
IMM GRANULOCYTES NFR BLD AUTO: 0.7 % (ref 0–0.5)
LYMPHOCYTES # BLD AUTO: 1.3 K/UL (ref 1–4.8)
LYMPHOCYTES NFR BLD: 12.9 % (ref 18–48)
MCH RBC QN AUTO: 30.1 PG (ref 27–31)
MCHC RBC AUTO-ENTMCNC: 34.9 G/DL (ref 32–36)
MCV RBC AUTO: 86 FL (ref 82–98)
MONOCYTES # BLD AUTO: 0.4 K/UL (ref 0.3–1)
MONOCYTES NFR BLD: 4 % (ref 4–15)
NEUTROPHILS # BLD AUTO: 8.1 K/UL (ref 1.8–7.7)
NEUTROPHILS NFR BLD: 82.3 % (ref 38–73)
NRBC BLD-RTO: 0 /100 WBC
OVALOCYTES BLD QL SMEAR: ABNORMAL
PLATELET # BLD AUTO: 156 K/UL (ref 150–350)
PMV BLD AUTO: ABNORMAL FL (ref 9.2–12.9)
POIKILOCYTOSIS BLD QL SMEAR: SLIGHT
POLYCHROMASIA BLD QL SMEAR: ABNORMAL
POTASSIUM SERPL-SCNC: 4.7 MMOL/L (ref 3.5–5.1)
RBC # BLD AUTO: 4.68 M/UL (ref 4–5.4)
SODIUM SERPL-SCNC: 136 MMOL/L (ref 136–145)
WBC # BLD AUTO: 9.89 K/UL (ref 3.9–12.7)

## 2019-05-20 PROCEDURE — 36415 COLL VENOUS BLD VENIPUNCTURE: CPT

## 2019-05-20 PROCEDURE — 97165 OT EVAL LOW COMPLEX 30 MIN: CPT

## 2019-05-20 PROCEDURE — 99024 PR POST-OP FOLLOW-UP VISIT: ICD-10-PCS | Mod: ,,, | Performed by: PHYSICIAN ASSISTANT

## 2019-05-20 PROCEDURE — 97116 GAIT TRAINING THERAPY: CPT

## 2019-05-20 PROCEDURE — 99024 POSTOP FOLLOW-UP VISIT: CPT | Mod: ,,, | Performed by: PHYSICIAN ASSISTANT

## 2019-05-20 PROCEDURE — 80048 BASIC METABOLIC PNL TOTAL CA: CPT

## 2019-05-20 PROCEDURE — 63600175 PHARM REV CODE 636 W HCPCS: Performed by: STUDENT IN AN ORGANIZED HEALTH CARE EDUCATION/TRAINING PROGRAM

## 2019-05-20 PROCEDURE — 11000001 HC ACUTE MED/SURG PRIVATE ROOM

## 2019-05-20 PROCEDURE — 97161 PT EVAL LOW COMPLEX 20 MIN: CPT

## 2019-05-20 PROCEDURE — 25000003 PHARM REV CODE 250: Performed by: STUDENT IN AN ORGANIZED HEALTH CARE EDUCATION/TRAINING PROGRAM

## 2019-05-20 PROCEDURE — 97535 SELF CARE MNGMENT TRAINING: CPT

## 2019-05-20 PROCEDURE — 85025 COMPLETE CBC W/AUTO DIFF WBC: CPT

## 2019-05-20 RX ADMIN — HEPARIN SODIUM 5000 UNITS: 5000 INJECTION, SOLUTION INTRAVENOUS; SUBCUTANEOUS at 02:05

## 2019-05-20 RX ADMIN — HYDROMORPHONE HYDROCHLORIDE 0.2 MG: 1 INJECTION, SOLUTION INTRAMUSCULAR; INTRAVENOUS; SUBCUTANEOUS at 12:05

## 2019-05-20 RX ADMIN — BISACODYL 10 MG RECTAL SUPPOSITORY 10 MG: at 09:05

## 2019-05-20 RX ADMIN — CEFAZOLIN 2 G: 1 INJECTION, POWDER, FOR SOLUTION INTRAMUSCULAR; INTRAVENOUS at 04:05

## 2019-05-20 RX ADMIN — HEPARIN SODIUM 5000 UNITS: 5000 INJECTION, SOLUTION INTRAVENOUS; SUBCUTANEOUS at 05:05

## 2019-05-20 RX ADMIN — CEFAZOLIN 2 G: 1 INJECTION, POWDER, FOR SOLUTION INTRAMUSCULAR; INTRAVENOUS at 01:05

## 2019-05-20 RX ADMIN — MUPIROCIN 1 G: 20 OINTMENT TOPICAL at 09:05

## 2019-05-20 RX ADMIN — TAMSULOSIN HYDROCHLORIDE 0.4 MG: 0.4 CAPSULE ORAL at 09:05

## 2019-05-20 RX ADMIN — HYDROCODONE BITARTRATE AND ACETAMINOPHEN 1 TABLET: 10; 325 TABLET ORAL at 09:05

## 2019-05-20 RX ADMIN — HYDROCODONE BITARTRATE AND ACETAMINOPHEN 1 TABLET: 10; 325 TABLET ORAL at 04:05

## 2019-05-20 RX ADMIN — HEPARIN SODIUM 5000 UNITS: 5000 INJECTION, SOLUTION INTRAVENOUS; SUBCUTANEOUS at 10:05

## 2019-05-20 NOTE — PLAN OF CARE
Problem: Occupational Therapy Goal  Goal: Occupational Therapy Goal  Goals to be met by: 5/27/2019    Patient will increase functional independence with ADLs by performing:    UE Dressing to don shirt and LSO brace with Supervision.  LE Dressing to don pants with Supervision with AD as needed.  Grooming while standing at sink with Supervision.  Toileting from toilet with Supervision for hygiene and clothing management.   Stand pivot transfers with Supervision.  Toilet transfer to toilet with Supervision.       Outcome: Ongoing (interventions implemented as appropriate)  OT eval complete. Pt tolerated session well. Pt's functional outcomes have been established today based on her presenting functional level.       Comments: Cont OT POC

## 2019-05-20 NOTE — OP NOTE
DATE OF PROCEDURE: 5/19/2019     PREOPERATIVE DIAGNOSES:   Cauda equina syndrome [G83.4]    POSTOPERATIVE DIAGNOSES:   Cauda equina syndrome [G83.4]    Surgeon(s) and Role:     * Brandon Hardy MD - Primary    RESIDENT:  Ghanshyam Cyr MD (neurosurgery resident)    PROCEDURES PERFORMED:   L5 laminectomy.  L4, and S1 partial laminectomies.    Left-sided L5 foraminotomy.    L5-S1 microdiskectomy.    Neuro monitoring, using EMG.    ANESTHESIA: General    ESTIMATED BLOOD LOSS:  250 cc      INDICATION FOR PROCEDURE: Leti Babb is a 39 y.o. year old female  That presented to the emergency room, transferred from an outside hospital with signs and symptoms of cauda equina syndrome.  Patient presented with bladder or bowel incontinence.  After getting an MRI that showed a large L5-S1 herniated disc, obliterating the spinal canal, risks and benefits of surgery were discussed with the patient, and the patient consented to proceed with surgery.      OPERATIVE NOTE: After obtaining informed consent, the patient was brought into   the Operating Room. she was intubated and anesthetized by Anesthesia.   Preoperative antibiotics as well as dexamethasone were administered. The   patient was placed prone on   A Jamie table.  Neuro monitoring wires were placed.  The back was then prepped and draped in the standard sterile fashion. Fluoroscopy was   brought into the field to confirm the appropriate level on the left side at the   L5-S1 facet joint. At this level, an approximately 2 cm   Midline incision was made at the level of the L5 lamina. The METRx tube was parked on the left L5 lamina and the left L5-S1 facet joint. Once the METRx tube was in position, we brought in the operating microscope. Using microsurgical technique, soft tissue was removed overlying the lamina and the facet joint of L5-S1. We were able to identify the inferior aspect of the L5 lamina and the medial aspect of the left L5-S1 facet joint. We then  used a high-speed drill to perform an ipsilateral laminotomy and partial medial facetectomy. A foraminotomy was performed using Kerrison rongeurs to ensure there was no compression of the nerve root prior to dural sac retraction. Ligamentum flavum was identified and Kerrison rongeurs were then used to remove the ligamentum flavum. The thecal   sac and nerve root were identified and the nerve root was retracted medially. There were   significant engorged epidural veins, which were cauterized using bipolar   electrocautery. The disk space was identified and a #15 blade was   used to incise the disk space. The diskectomy was performed in the normal   fashion using curettes and pituitary rongeurs.  A large piece of this fragment was removed.  There was a fragment of disc that was underneath the midline portion of the dura.  After multiple attempts of trying to remove this disc we were not successful.  The dural sac was still compressing the midline, and the dura was not able to be retracted enough to get access to the midline portion of the canal.  At this point, we decided to convert the minimal invasive surgery to a regular open approach.    Tubular retractors were removed.  Microscope was removed.    The midline incision was extended to about 6 cm.  Using monopolar Bovie cautery Cassie continues tissue was  Dissected.  Fascia was opened.  Subperiosteal dissection was carried to expose the right-sided part of the L5 lamina.  Leksell rongeur was used to remove the spinal process of L5.  Bovie cautery was used to expose the inferior part of the L4 lamina, and the superior part of the S1 lamina.  At this point, using a combination of a high-speed drill and different sizes Kerrison rongeur years, I will complete L5 laminectomy was performed.  We visualized the area of the biggest disc herniation, under the midline portion of the dura.  We tried to retract the dura from left to right side, but we were unable to get enough  access to midline.  At this point we decided to perform bilateral L5 foraminotomies and extend the laminectomy to the superior part of the S1 lamina, and the inferior part of the L4 lamina.  On this point, the operative microscope was brought back into the field.  Under microscopic guidance, using microscopic technique, the dura was retracted from left to right side, midline access was gained, and a large partially calcified disc was  Peeled away from the dura and removed from underneath the dura.   We spent an additional 90 min trying to peel the disc from the ventral dura without creating any durotomy is, and spinal fluid leakage. There was a large defect in the annulus fibrosis.  At this point, using a series of pituitary rongeurs along with down-biting Curet a complete diskectomy was performed at L5-S1. Once we were satisfied with the diskectomy, the disk   space was again probed to ensure there were no remaining disk fragments that   were left behind. Once we were happy with the   decompression, a Valdo was used to pass underneath the nerve root and into the   foramen, which all seemed to be free from compression. Hemostasis was obtained   using FloSeal. The wound was thoroughly irrigated. A small piece of duragen   Was placed over the dura.  Wound was copiously irrigated with bacitracin irrigation.  A 10. Round TRACEY drain was placed over the dura. Hemostasis was achieved with bipolar cautery and the wound was closed in layers.The patient was flipped back supine onto the   stretcher and extubated by Anesthesia. She was brought to the Recovery Room in   stable condition. All counts were correct at the end of the case and  neuromonitoring remained stable throughout the case.    Dr. Samuel was present during the entire procedure.     This case Trey 22 modifier, due to the time spent in removing the ventral disc from the dura, the extra amount of time  Taking in doing partial S1 and L4 laminotomies to allowed dural  retraction, and a conversion from a minimally invasive approach to an open approach to gain retraction power over the dura.

## 2019-05-20 NOTE — CARE UPDATE
Rapid Response Nurse Chart Check     Chart check completed, abnormal VS noted, BP 98/48 bedside RNSia contacted, no concerns verbalized at this time. Rechecked /50. Instructed to call [phone] for further concerns or assistance.

## 2019-05-20 NOTE — PLAN OF CARE
05/20/19 1136   Post-Acute Status   Post-Acute Authorization Other   Other Status No Post-Acute Service Needs       No SW needs identified at this time.       Romy Mccurdy  SW  [facility]

## 2019-05-20 NOTE — PT/OT/SLP EVAL
Occupational Therapy   Evaluation/ Treatment    Name: Leti Babb  MRN: [id number]  Admitting Diagnosis:  <principal problem not specified> 1 Day Post-Op    Recommendations:     Discharge Recommendations: outpatient PT  Discharge Equipment Recommendations:  walker, rolling  Barriers to discharge:  Inaccessible home environment    Assessment:     Leti Babb is a 39 y.o. female with a medical diagnosis of <principal problem not specified>.  She presents with the following performance deficits affecting function: weakness, impaired endurance, impaired self care skills, impaired sensation, impaired balance, gait instability, impaired functional mobilty, pain, decreased lower extremity function, orthopedic precautions.      OT eval complete. Pt tolerated session well. She presents w/ good overall B UE function and strength. Pt reports minimal B LE numbness which recovered when OOB. Pt currently requires Min A/ CGA for functional mobility tasks. She was able to perform LB self-care task w/ extra time allowed and executing adaptive technique to maintain her spinal precautions. Pt educated on LSO wearing schedule (whenever OOB) and management. Pt will continue to benefit from skilled OT to address the above listed impairments.     Rehab Prognosis: Good; patient would benefit from acute skilled OT services to address these deficits and reach maximum level of function.       Plan:     Patient to be seen 4 x/week to address the above listed problems via self-care/home management, therapeutic activities, therapeutic exercises  · Plan of Care Expires: 06/19/19  · Plan of Care Reviewed with: patient    Subjective     Chief Complaint: pain  Patient/Family Comments/goals: to get better    Occupational Profile:  Living Environment: Pt states she will be staying with her parents in [facility] w/ 4 YAEL and L HR support. Pt will be using a tub/shower combo  Previous level of function: PTA, pt reports being independent w/ ADLs and  functional mobility   Equipment Used at Home:  none  Assistance upon Discharge: Pt states she will have assistance from her parents upon d/c    Pain/Comfort:  · Pain Rating 1: 8/10  · Location - Orientation 1: lower  · Location 1: back  · Pain Addressed 1: Reposition, Pre-medicate for activity  · Pain Rating Post-Intervention 1: (did not rate)    Patients cultural, spiritual, Taoist conflicts given the current situation: no    Objective:     Communicated with: RN prior to session.  Patient found HOB elevated with TRACEY drain, SCD upon OT entry to room.    General Precautions: Standard, fall   Orthopedic Precautions:spinal precautions   Braces: LSO(when OOB)     Occupational Performance:    Bed Mobility:    · Patient completed Rolling/Turning to Left with  minimum assistance  · Patient completed Rolling/Turning to Right with contact guard assistance  · Patient completed Scooting/Bridging with contact guard assistance  · Patient completed Supine to Sit with minimum assistance for trunk elevation    Functional Mobility/Transfers:  · Patient completed Sit <> Stand Transfer with contact guard assistance  with  rolling walker   · Functional Mobility: Pt ambulated ~80ft w/ CGA and RW for increased stability and safety.     Activities of Daily Living:  · Upper Body Dressing: moderate assistance to don back gown adn LSO brace sitting EOB. Pt provided w/ LSO education   · Lower Body Dressing: stand by assistance sitting UIC donning B non skid socks w/ extra time allowed     Cognitive/Visual Perceptual:  Cognitive/Psychosocial Skills:     -       Oriented to: Person, Place and Time   -       Follows Commands/attention:Follows multistep  commands  -       Communication: clear/fluent    Physical Exam:  Upper Extremity Range of Motion:     -       Right Upper Extremity: WFL  -       Left Upper Extremity: WFL  Upper Extremity Strength:    -       Right Upper Extremity: WFL  -       Left Upper Extremity: WFL   Strength:    -        Right Upper Extremity: WFL  -       Left Upper Extremity: WFL  Fine Motor Coordination:    -       Intact    AMPAC 6 Click ADL:  AMPAC Total Score: 21    Treatment & Education:  - OT role/POC  - Importance of OOB activity to maximize recovery   - safety w/ functional mobility; hand placement to ensure safe transfers  - educated on spinal precautions prior to OOB activity  - LSO education and management  - LBD adaptive technique  Education:    Patient left up in chair with all lines intact, call button in reach and RN notified    GOALS:   Multidisciplinary Problems     Occupational Therapy Goals        Problem: Occupational Therapy Goal    Goal Priority Disciplines Outcome Interventions   Occupational Therapy Goal     OT, PT/OT Ongoing (interventions implemented as appropriate)    Description:  Goals to be met by: 5/27/2019    Patient will increase functional independence with ADLs by performing:    UE Dressing to don shirt and LSO brace with Supervision.  LE Dressing to don pants with Supervision with AD as needed.  Grooming while standing at sink with Supervision.  Toileting from toilet with Supervision for hygiene and clothing management.   Stand pivot transfers with Supervision.  Toilet transfer to toilet with Supervision.                         History:     History reviewed. No pertinent past medical history.    Past Surgical History:   Procedure Laterality Date    LAMINECTOMY, SPINE, LUMBAR, WITH FUSION (L5/S1) MIS laminectomy Microscope, metrix set (do not open) Class A N/A 5/19/2019    Performed by Brandon Hardy MD at [facility] OR [address] [place] [address]       Time Tracking:     OT Date of Treatment: 05/20/19  OT Start Time: 0921  OT Stop Time: 0950  OT Total Time (min): 29 min    Billable Minutes:Evaluation 10  Self Care/Home Management 19    Sherrie Wheeler OT  5/20/2019

## 2019-05-20 NOTE — PLAN OF CARE
Problem: Fluid Volume Excess  Goal: Fluid Balance    Intervention: Monitor and Manage Hypervolemia  Pt on 1500 fluid restriction. Continue to monitor.

## 2019-05-20 NOTE — ANESTHESIA POSTPROCEDURE EVALUATION
Anesthesia Post Evaluation    Patient: Leti Babb    Procedure(s) Performed: Procedure(s) (LRB):  LAMINECTOMY, SPINE, LUMBAR, WITH FUSION (L5/S1) MIS laminectomy Microscope, metrix set (do not open) Class A (N/A)    Final Anesthesia Type: general  Patient location during evaluation: PACU  Patient participation: Yes- Able to Participate  Level of consciousness: awake and alert  Post-procedure vital signs: reviewed and stable  Pain management: adequate  Airway patency: patent  PONV status at discharge: No PONV  Anesthetic complications: no      Cardiovascular status: blood pressure returned to baseline and hemodynamically stable  Respiratory status: spontaneous ventilation, unassisted and room air  Follow-up not needed.          Vitals Value Taken Time   /65 5/20/2019 12:17 AM   Temp 36.6 °C (97.9 °F) 5/19/2019 11:39 PM   Pulse 81 5/20/2019 12:31 AM   Resp 16 5/20/2019 12:31 AM   SpO2 99 % 5/20/2019 12:31 AM   Vitals shown include unvalidated device data.      No case tracking events are documented in the log.      Pain/Dariana Score: Pain Rating Prior to Med Admin: 7 (5/20/2019 12:29 AM)  Dariana Score: 8 (5/19/2019 11:39 PM)

## 2019-05-20 NOTE — BRIEF OP NOTE
[facility]  Brief Operative Note    SUMMARY     Surgery Date: 5/19/2019     Surgeon(s) and Role:     * Brandon Hardy MD - Primary    Assisting Surgeon: None    Pre-op Diagnosis:  Cauda equina syndrome [G83.4]    Post-op Diagnosis:  Post-Op Diagnosis Codes:     * Cauda equina syndrome [G83.4]    Procedure(s) (LRB):  LAMINECTOMY, SPINE, LUMBAR, WITH FUSION (L5/S1) MIS laminectomy Microscope, metrix set (do not open) Class A (N/A)    Anesthesia: General    Description of Procedure: L5/S1 laminectomy/discectomy    Description of the findings of the procedure: above    Estimated Blood Loss: * No values recorded between 5/19/2019  8:17 PM and 5/19/2019 11:37 PM *         Specimens:   Specimen (12h ago, onward)    None

## 2019-05-20 NOTE — PLAN OF CARE
Problem: Physical Therapy Goal  Goal: Physical Therapy Goal  Goals to be met by: 6/3/19    Patient will increase functional independence with mobility by performin. Supine to sit with supervision  2. Sit to stand transfer with supervision  3. Gait x200 feet with supervision using DME as needed  4. Ascend/descend 4 stairs with bilateral Handrails with supervision      Patient would continue to benefit from PT.

## 2019-05-20 NOTE — PLAN OF CARE
CM met with patient to obtain discharge planning assessment.  Patient is POD 1 for laminectomy and fusion.  Planned discharge is home with family - Plan (A) and (B).    PCP:  Vito Figueredo MD     Payor:  Payor: MEDICAID / Plan: [place]UE [place] / Product Type: Managed Medicaid /      Pharmacy:    [facility] [address], [place] - [address]  [address]  [place] [address]  Phone: [phone] Fax: [phone]       05/20/19 1528   Discharge Assessment   Assessment Type Discharge Planning Assessment   Confirmed/corrected address and phone number on facesheet? Yes   Assessment information obtained from? Patient   Expected Length of Stay (days) 3   Communicated expected length of stay with patient/caregiver no   Prior to hospitilization cognitive status: Alert/Oriented   Prior to hospitalization functional status: Independent   Current cognitive status: Alert/Oriented   Current Functional Status: Independent   Lives With parent(s)   Able to Return to Prior Arrangements yes   Is patient able to care for self after discharge? Unable to determine at this time (comments)   Who are your caregiver(s) and their phone number(s)? [place] t[employer] [phone]   Patient's perception of discharge disposition home or selfcare   Readmission Within the Last 30 Days no previous admission in last 30 days   Patient currently being followed by outpatient case management? No   Patient currently receives any other outside agency services? No   Equipment Currently Used at Home none   Do you have any problems affording any of your prescribed medications? No   Is the patient taking medications as prescribed? yes   Does the patient have transportation home? Yes   Transportation Anticipated family or friend will provide   Does the patient receive services at the [facility]? No   Discharge Plan A Home with family   Discharge Plan B Home with family   DME Needed Upon Discharge  none    Patient/Family in Agreement with Plan yes

## 2019-05-20 NOTE — H&P
[facility]  Neuorsurgery  History and Physical     Patient Name: Leti Babb  MRN: [id number]  Admission Date: 5/19/2019  Attending Physician: Brandon Samuel MD  Primary Care Physician: Vito Figueredo MD    Patient information was obtained from patient and ER records.     Subjective:     Chief Complaint/Reason for Admission: Cauda Equina Syndrome     HPI:  39F w/ PMH smoking but no other contributory medical conditions who presents from OSH w/ cauda equina syndrome from L5/S1 HNP. Patient states that she has had LBP radiating into her BLE to the knee since December. Last week she fell on her back in the bathtub and her pain has been progressively worse since. Yesterday she realized that she was having difficulty with urination, abdominal pain and was unable to control her voiding. She presented to OS where she was evaluated and a padgett was placed w/ 1500mL of urine returned. She was transferred to [facility] ED where MRI shows L5/S1 HNP. Additionally, she describes saddle anesthesia. ROS is otherwise negative.      (Not in a hospital admission)    Review of patient's allergies indicates:  No Known Allergies    History reviewed. No pertinent past medical history.  History reviewed. No pertinent surgical history.  Family History     None        Tobacco Use    Smoking status: Current Every Day Smoker     Packs/day: 0.50     Types: Cigarettes   Substance and Sexual Activity    Alcohol use: Not Currently    Drug use: Not on file    Sexual activity: Not on file     Review of Systems   Constitutional: Negative for activity change, chills, fatigue, fever and unexpected weight change.   HENT: Negative for tinnitus and voice change.    Eyes: Negative for photophobia and visual disturbance.   Respiratory: Negative for cough, choking, chest tightness, shortness of breath, wheezing and stridor.    Cardiovascular: Negative for chest pain and palpitations.   Gastrointestinal: Negative for abdominal  distention, abdominal pain, constipation, diarrhea, nausea and vomiting.   Endocrine: Negative for polydipsia, polyphagia and polyuria.   Genitourinary: Negative for difficulty urinating, dysuria, frequency, hematuria and urgency.        Urinary retention   Musculoskeletal: Positive for back pain (w/ rdx into BLE). Negative for gait problem and neck stiffness.   Skin: Negative for pallor, rash and wound.   Neurological: Positive for numbness. Negative for dizziness, tremors, seizures, syncope, facial asymmetry, speech difficulty, weakness, light-headedness and headaches.   Psychiatric/Behavioral: Negative for agitation, behavioral problems and confusion.     Objective:     Weight: 83.5 kg (184 lb)  Body mass index is 30.62 kg/m².  Vital Signs (Most Recent):  Temp: 98.2 °F (36.8 °C) (05/19/19 1833)  Pulse: 72 (05/19/19 1833)  Resp: 15 (05/19/19 1833)  BP: (!) 107/51 (05/19/19 1833)  SpO2: 99 % (05/19/19 1833) Vital Signs (24h Range):  Temp:  [98.1 °F (36.7 °C)-98.2 °F (36.8 °C)] 98.2 °F (36.8 °C)  Pulse:  [72-86] 72  Resp:  [14-16] 15  SpO2:  [99 %-100 %] 99 %  BP: (107-136)/(51-63) 107/51                          Urethral Catheter 05/19/19 Latex 16 Fr. (Active)       Neurosurgery Physical Exam  General: AOx3, GCS [id number]  CNII-XII: Intact on fine exam, PERRL, EOMi, facial sensation preserved, no facial assymetry, tongue/uvula/palate midline, shoulder shrug equal, No pronator drift  Extremities:  Motor:  Upper Extremity:                                  Deltoids        Triceps        Biceps        WE        WF                Interosseous           R        5/5              5/5              5/5            5/5         5/5         5/5                5/5           L        5/5              5/5              5/5            5/5         5/5         5/5                5/5  Lower Extremity:                                      HF        KE        KF        DF        PF        EHL           R       5/5 5/5        5/5         5/5        5/5        5/5           L       5/5        5/5        5/5        5/5        5/5        5/5  Rectal tone: Absent    Reflexes:     DTR: 1+ and symmetrically throughout     Jose's: Negative     Babinski's: Negative     Clonus: Negative    Sensory:      Sensorium intact throughout, no sensory level present, Saddle anesthesia noted on exam    Coordination:      Coordination intact throughout    Gross Exam:  Posture: No focal or global spinal deformity visible on inspection.  Bending: Obvious pain with movement precluding ROM activities     Cervical Spine:      Midline TTP: Negative    Thoracic Spine:     Midline TTP: Negative     Lumbar Spine:     Midline TTP: Negative     Paraspinal TTP: Present     Straight leg raise: Negative BLE    Significant Labs:  No results for input(s): GLU, NA, K, CL, CO2, BUN, CREATININE, CALCIUM, MG in the last 48 hours.  Recent Labs   Lab 05/19/19  1608   WBC 6.28   HGB 13.3   HCT 40.7        Recent Labs   Lab 05/19/19  1608   INR 0.9   APTT 23.6     Microbiology Results (last 7 days)     ** No results found for the last 168 hours. **        ABGs: No results for input(s): PH, PCO2, PO2, HCO3, POCSATURATED, BE in the last 48 hours.  Cardiac markers: No results for input(s): CKMB, CPKMB, TROPONINT, TROPONINI, MYOGLOBIN in the last 48 hours.  CMP: No results for input(s): GLU, CALCIUM, ALBUMIN, PROT, NA, K, CO2, CL, BUN, CREATININE, ALKPHOS, ALT, AST, BILITOT in the last 48 hours.  CRP: No results for input(s): CRP in the last 48 hours.  ESR: No results for input(s): POCESR, ERYTHROCYTES in the last 48 hours.  LFTs: No results for input(s): ALT, AST, ALKPHOS, BILITOT, PROT, ALBUMIN in the last 48 hours.  Procalcitonin: No results for input(s): PROCAL in the last 48 hours.    Significant Diagnostics:  I have reviewed all pertinent imaging results/findings within the past 24 hours.    Assessment and Plan:     Cauda equina syndrome  39F w/ PMH smoking but no other  contributory medical conditions who presents from OSH w/ cauda equina syndrome from L5/S1 HNP:    --Evaluated at bedside in ED  --All labs and diagnostics reviewed  --MRI w/ L5/S1 HNP  --Clinical picture of cauda equina syndrome confirmed  --To OR for emergent decompression  --Further reccs to follow surgery        Teto Cyr MD  Neurosurgery  [facility]

## 2019-05-20 NOTE — PROGRESS NOTES
[facility]  Neurosurgery  Progress Note    Subjective:     History of Present Illness: 39F w/ PMH smoking but no other contributory medical conditions who presents from OSH w/ cauda equina syndrome from L5/S1 HNP. Patient states that she has had LBP radiating into her BLE to the knee since December. Last week she fell on her back in the bathtub and her pain has been progressively worse since. Yesterday she realized that she was having difficulty with urination, abdominal pain and was unable to control her voiding. She presented to OS where she was evaluated and a padgett was placed w/ 1500mL of urine returned. She was transferred to [facility] ED where MRI shows L5/S1 HNP. Additionally, she describes saddle anesthesia. ROS is otherwise negative.    Post-Op Info:  Procedure(s) (LRB):  LAMINECTOMY, SPINE, LUMBAR, WITH FUSION (L5/S1) MIS laminectomy Microscope, metrix set (do not open) Class A (N/A)   1 Day Post-Op     Interval History:  NAEON. Reports incisional back pain. Continued saddle anesthesia. LSO brace in place. Padgett in place. Denies worsening weakness, numbness, paresthesias. Up to chair this morning, tolerated well. Drain output 65cc overnight.     Medications:  Continuous Infusions:  Scheduled Meds:   bisacodyl  10 mg Rectal Daily    ceFAZolin (ANCEF) IVPB  2 g Intravenous Q8H    heparin (porcine)  5,000 Units Subcutaneous Q8H    mupirocin  1 g Nasal BID    tamsulosin  0.4 mg Oral Daily     PRN Meds:acetaminophen, aluminum-magnesium hydroxide-simethicone, dextrose 50%, dextrose 50%, diazePAM, glucagon (human recombinant), glucose, glucose, glucose, HYDROcodone-acetaminophen, insulin aspart U-100, morphine, ondansetron, promethazine (PHENERGAN) IVPB, senna-docusate 8.6-50 mg, sodium chloride 0.9%       Objective:     Weight: 92.2 kg (203 lb 4.2 oz)  Body mass index is 33.82 kg/m².  Vital Signs (Most Recent):  Temp: 98.2 °F (36.8 °C) (05/20/19 0749)  Pulse: 72 (05/20/19 0749)  Resp: 18  "(05/20/19 0749)  BP: 117/77 (05/20/19 0749)  SpO2: 100 % (05/20/19 0749) Vital Signs (24h Range):  Temp:  [97.9 °F (36.6 °C)-98.7 °F (37.1 °C)] 98.2 °F (36.8 °C)  Pulse:  [] 72  Resp:  [13-21] 18  SpO2:  [96 %-100 %] 100 %  BP: (107-137)/(51-77) 117/77                          Closed/Suction Drain 05/19/19 2304 Right Back Bulb 15 Fr. (Active)   Site Description Edema/swelling 5/20/2019  9:00 AM   Dressing Type Transparent 5/20/2019  9:00 AM   Dressing Status Clean;Dry;Intact 5/20/2019  9:00 AM   Drainage Serosanguineous 5/20/2019  9:00 AM   Status To bulb suction 5/20/2019  9:00 AM   Output (mL) 45 mL 5/20/2019  4:09 AM            Urethral Catheter 05/19/19 Latex 16 Fr. (Active)   Site Assessment Clean;Intact 5/20/2019  9:00 AM   Collection Container Urimeter 5/20/2019  9:00 AM   Securement Method secured to top of thigh w/ adhesive device 5/20/2019  9:00 AM   Catheter Care Performed yes 5/20/2019  9:00 AM   Reason for Continuing Urinary Catheterization Post operative 5/20/2019  9:00 AM   CAUTI Prevention Bundle StatLock in place w 1" slack;Intact seal between catheter & drainage tubing;Drainage bag off the floor;Green sheeting clip in use;No dependent loops or kinks;Drainage bag below bladder;Drainage bag not overfilled (<2/3 full) 5/20/2019  9:00 AM   Output (mL) 250 mL 5/20/2019  4:09 AM       Neurosurgery Physical Exam    General: well developed, well nourished, no distress.   Head: normocephalic, atraumatic  Neurologic: Alert and oriented. Thought content appropriate.  GCS: Motor: 6/Verbal: 5/Eyes: 4 GCS Total: 15  Mental Status: Awake, Alert, Oriented x 4  Language: No aphasia  Speech: No dysarthria  Cranial nerves: face symmetric, tongue midline, CN II-XII grossly intact.   Eyes: pupils equal, round, reactive to light with accomodation, EOMI.  Pulmonary: normal respirations, no signs of respiratory distress  Abdomen: soft, non-distended, not tender to palpation  Sensory: intact to light touch " throughout  Motor Strength: Moves all extremities spontaneously with good tone.  Full strength upper and lower extremities. No abnormal movements seen.     Strength  Deltoids Triceps Biceps Wrist Extension Wrist Flexion Hand    Upper: R 5/5 5/5 5/5 5/5 5/5 5/5    L 5/5 5/5 5/5 5/5 5/5 5/5     Iliopsoas Quadriceps Knee  Flexion Tibialis  anterior Gastro- cnemius EHL   Lower: R 5/5 5/5 5/5 5/5 5/5 5/5    L 5/5 5/5 5/5 5/5 5/5 5/5     Jose: absent  Clonus: absent  Babinski: absent  Pulses: 2+ and symmetric radial and dorsalis pedis.  Skin: Skin is warm, dry and intact.  Incision covered with dressing. No surrounding erythema or edema. No active drainage. TRACEY drain in place.   LSO brace in place.       Significant Labs:  Recent Labs   Lab 05/19/19  1608 05/20/19  0434   * 118*    136   K 4.4 4.7    104   CO2 21* 20*   BUN 36* 24*   CREATININE 2.6* 1.4   CALCIUM 9.0 8.8     Recent Labs   Lab 05/19/19  1608 05/20/19  0434   WBC 6.28 9.89   HGB 13.3 14.1   HCT 40.7 40.4    156     Recent Labs   Lab 05/19/19  1608   INR 0.9   APTT 23.6     Microbiology Results (last 7 days)     ** No results found for the last 168 hours. **        Recent Lab Results       05/20/19  0434   05/19/19  1608        Albumin   3.1     Alkaline Phosphatase   45     ALT   8     Anion Gap 12 11     Aniso Slight       aPTT   23.6  Comment:  aPTT therapeutic range = 39-69 seconds     AST   17     Baso # 0.01 0.01     Basophil% 0.1 0.2     BILIRUBIN TOTAL   0.2  Comment:  For infants and newborns, interpretation of results should be based  on gestational age, weight and in agreement with clinical  observations.  Premature Infant recommended reference ranges:  Up to 24 hours.............<8.0 mg/dL  Up to 48 hours............<12.0 mg/dL  3-5 days..................<15.0 mg/dL  6-29 days.................<15.0 mg/dL       BUN, Bld 24 36     Calcium 8.8 9.0     Chloride 104 106     CO2 20 21     Creatinine 1.4 2.6      Differential Method Automated Automated     eGFR if  55.4 26.2     eGFR if non  48.0  Comment:  Calculation used to obtain the estimated glomerular filtration  rate (eGFR) is the CKD-EPI equation.    22.7  Comment:  Calculation used to obtain the estimated glomerular filtration  rate (eGFR) is the CKD-EPI equation.        Eos # 0.0 0.0     Eosinophil% 0.0 0.2     Glucose 118 112     Gran # (ANC) 8.1 5.5     Gran% 82.3 87.9     Group & Rh   A POS     Hematocrit 40.4 40.7     Hemoglobin 14.1 13.3     Hypo Occasional       Immature Grans (Abs) 0.07  Comment:  Mild elevation in immature granulocytes is non specific and   can be seen in a variety of conditions including stress response,   acute inflammation, trauma and pregnancy. Correlation with other   laboratory and clinical findings is essential.   0.02  Comment:  Mild elevation in immature granulocytes is non specific and   can be seen in a variety of conditions including stress response,   acute inflammation, trauma and pregnancy. Correlation with other   laboratory and clinical findings is essential.       Immature Granulocytes 0.7 0.3     INDIRECT ARA   NEG     Coumadin Monitoring INR   0.9  Comment:  Coumadin Therapy:  2.0 - 3.0 for INR for all indicators except mechanical heart valves  and antiphospholipid syndromes which should use 2.5 - 3.5.       Lymph # 1.3 0.6     Lymph% 12.9 10.0     MCH 30.1 29.8     MCHC 34.9 32.7     MCV 86 91     Mono # 0.4 0.1     Mono% 4.0 1.4     MPV SEE COMMENT  Comment:  Result not available. 10.9     nRBC 0 0     Ovalocytes Occasional       Platelets 156 227     Poik Slight       Poly Occasional       Potassium 4.7 4.4     PROTEIN TOTAL   7.0     Protime   9.9     RBC 4.68 4.46     RDW 14.9 15.1     Sodium 136 138     Tear Drop Cells Occasional       WBC 9.89 6.28         All pertinent labs from the last 24 hours have been reviewed.    Significant Diagnostics:  All pertinent imaging reviewed.      Assessment/Plan:     Cauda equina syndrome  39F w/ PMH smoking but no other contributory medical conditions who presents from OSH w/ cauda equina syndrome from L5/S1 HNP. MRI w/ L5/S1 HNP. Now s/p MIS L5/S1 laminectomy/discectomy on 5/19.    - Doing well postoperatively. Neurologically stable  - Kidney function labs stabilizing. Nash in place. Will attempt voiding trial tomorrow. Will continue to monitor.  - TRACEY drain output 65cc overnight, continue. Continue antibiotic ppx while drain in place.   - Pain controlled with current regimen.  - Continue dressing to incision.  - LSO brace when up/OOB.  - Activity as tolerated. Continue PT/OT/OOB. OOB > 6hrs/day  - GI: Diet as tolerated. Continue bowel regimen.   - DVT ppx: SQH, Compression stockings, SCDs  - Atelectasis ppx: IS at bedside. Encourage use every hour while awake.                Betty Villalobos PA-C  Neurosurgery  [facility]

## 2019-05-20 NOTE — PROGRESS NOTES
Per Dr. Cyr no post op labs needed at this time. Orders to leave padgett in place. Back Brace ordered. tm

## 2019-05-20 NOTE — TRANSFER OF CARE
"Anesthesia Transfer of Care Note    Patient: Leti Babb    Procedure(s) Performed: Procedure(s) (LRB):  LAMINECTOMY, SPINE, LUMBAR, WITH FUSION (L5/S1) MIS laminectomy Microscope, metrix set (do not open) Class A (N/A)    Patient location: PACU    Anesthesia Type: general    Transport from OR: Transported from OR on 6-10 L/min O2 by face mask with adequate spontaneous ventilation    Post pain: adequate analgesia    Post assessment: no apparent anesthetic complications and tolerated procedure well    Post vital signs: stable    Level of consciousness: awake    Nausea/Vomiting: no nausea/vomiting    Complications: none    Transfer of care protocol was followed      Last vitals:   Visit Vitals  /72 (BP Location: Right arm, Patient Position: Lying)   Pulse 104   Temp 36.6 °C (97.9 °F) (Temporal)   Resp 19   Ht 5' 5" (1.651 m)   Wt 83.5 kg (184 lb)   SpO2 100%   Breastfeeding? No   BMI 30.62 kg/m²     "

## 2019-05-20 NOTE — PT/OT/SLP EVAL
Physical Therapy Evaluation    Patient Name:  Leti Babb   MRN:  [id number]    Recommendations:     Discharge Recommendations:  outpatient PT   Discharge Equipment Recommendations: walker, rolling   Barriers to discharge: Inaccessible home    Assessment:     Leti Babb is a 39 y.o. female admitted with a medical diagnosis of s/p L5-S1 laminectomy.  She presents with the following impairments/functional limitations:  weakness, impaired sensation, impaired functional mobilty, gait instability, impaired balance, decreased lower extremity function, pain, impaired skin, orthopedic precautions. Patient tolerated PT evaluation fairly well today. She was educated in log rolling to don brace while in bed. She ambulated 80ft with contact guard assistance and rolling walker. She would continue to benefit from PT in order to get back to PLOF.     Rehab Prognosis: Good; patient would benefit from acute skilled PT services to address these deficits and reach maximum level of function.    Recent Surgery: Procedure(s) (LRB):  LAMINECTOMY, SPINE, LUMBAR, WITH FUSION (L5/S1) MIS laminectomy Microscope, metrix set (do not open) Class A (N/A) 1 Day Post-Op    Plan:     During this hospitalization, patient to be seen 5 x/week to address the identified rehab impairments via gait training, therapeutic activities, therapeutic exercises and progress toward the following goals:    · Plan of Care Expires:  06/03/19    Subjective     Chief Complaint: Wants real food instead of liquid.   Patient/Family Comments/goals: To get back to PLOF.   Pain/Comfort:  · Pain Rating 1: 8/10  · Location - Orientation 1: lower  · Location 1: back  · Pain Addressed 1: Pre-medicate for activity, Reposition    Living Environment:  Patient will be going to stay with her parents at discharge. There will be 4 steps with a left handrail on the way up. Prior to admission, patients level of function was independent. She works as a  in a nursing  home. Equipment used at home: none. Upon discharge, patient will have assistance from parents.    Objective:     Communicated with RN prior to session.  Patient found supine with TRACEY drain, SCD  upon PT entry to room.    General Precautions: Standard, fall   Orthopedic Precautions:spinal precautions   Braces: LSO(when out of bed)     Exams:  · Cognitive Exam:  Patient is oriented to Person, Place, Time and Situation  · Sensation: decreased sensation in saddle region  · Skin Integrity/Edema:      · -       Skin integrity: Visible skin intact  · RLE ROM: WFL  · RLE Strength: WFL  · LLE ROM: WFL  · LLE Strength: WFL    Functional Mobility:  · Bed Mobility:     · Rolling Left:  minimum assistance to don brace   · Rolling Right: contact guard assistance to don brace  · Supine to Sit: contact guard assistance  · Transfers:     · Sit to Stand:  contact guard assistance with rolling walker  · Gait: Patient ambulated 80ft with contact guard assistance and rolling walker.        Therapeutic Activities and Exercises:  Patient educated in:  -OOB to maximize recovery  -how to don/doff brace and importance of wearing brace when out of bed  -safe hand placement with transfers  -gait sequence and RW management  -spinal precautions    AM-PAC 6 CLICK MOBILITY  Total Score:18     Patient left up in chair with all lines intact, call button in reach and RN notified.    GOALS:   Multidisciplinary Problems     Physical Therapy Goals        Problem: Physical Therapy Goal    Goal Priority Disciplines Outcome Goal Variances Interventions   Physical Therapy Goal     PT, PT/OT      Description:  Goals to be met by: 6/3/19    Patient will increase functional independence with mobility by performin. Supine to sit with supervision  2. Sit to stand transfer with supervision  3. Gait x200 feet with supervision using DME as needed  4. Ascend/descend 4 stairs with bilateral Handrails with supervision                      History:     History  reviewed. No pertinent past medical history.    Past Surgical History:   Procedure Laterality Date    LAMINECTOMY, SPINE, LUMBAR, WITH FUSION (L5/S1) MIS laminectomy Microscope, metrix set (do not open) Class A N/A 5/19/2019    Performed by Brandon Hardy MD at [facility] OR [address] [place] [address]       Time Tracking:     PT Received On: 05/20/19  PT Start Time: 0921     PT Stop Time: 0951  PT Total Time (min): 30 min     Billable Minutes: Evaluation 20 and Gait Training 10      Nelly Granados, PT  05/20/2019

## 2019-05-20 NOTE — SUBJECTIVE & OBJECTIVE
Interval History:  NAEON. Reports incisional back pain. Continued saddle anesthesia. LSO brace in place. Nash in place. Denies worsening weakness, numbness, paresthesias. Up to chair this morning, tolerated well. Drain output 65cc overnight.     Medications:  Continuous Infusions:  Scheduled Meds:   bisacodyl  10 mg Rectal Daily    ceFAZolin (ANCEF) IVPB  2 g Intravenous Q8H    heparin (porcine)  5,000 Units Subcutaneous Q8H    mupirocin  1 g Nasal BID    tamsulosin  0.4 mg Oral Daily     PRN Meds:acetaminophen, aluminum-magnesium hydroxide-simethicone, dextrose 50%, dextrose 50%, diazePAM, glucagon (human recombinant), glucose, glucose, glucose, HYDROcodone-acetaminophen, insulin aspart U-100, morphine, ondansetron, promethazine (PHENERGAN) IVPB, senna-docusate 8.6-50 mg, sodium chloride 0.9%       Objective:     Weight: 92.2 kg (203 lb 4.2 oz)  Body mass index is 33.82 kg/m².  Vital Signs (Most Recent):  Temp: 98.2 °F (36.8 °C) (05/20/19 0749)  Pulse: 72 (05/20/19 0749)  Resp: 18 (05/20/19 0749)  BP: 117/77 (05/20/19 0749)  SpO2: 100 % (05/20/19 0749) Vital Signs (24h Range):  Temp:  [97.9 °F (36.6 °C)-98.7 °F (37.1 °C)] 98.2 °F (36.8 °C)  Pulse:  [] 72  Resp:  [13-21] 18  SpO2:  [96 %-100 %] 100 %  BP: (107-137)/(51-77) 117/77                          Closed/Suction Drain 05/19/19 2304 Right Back Bulb 15 Fr. (Active)   Site Description Edema/swelling 5/20/2019  9:00 AM   Dressing Type Transparent 5/20/2019  9:00 AM   Dressing Status Clean;Dry;Intact 5/20/2019  9:00 AM   Drainage Serosanguineous 5/20/2019  9:00 AM   Status To bulb suction 5/20/2019  9:00 AM   Output (mL) 45 mL 5/20/2019  4:09 AM            Urethral Catheter 05/19/19 Latex 16 Fr. (Active)   Site Assessment Clean;Intact 5/20/2019  9:00 AM   Collection Container Urimeter 5/20/2019  9:00 AM   Securement Method secured to top of thigh w/ adhesive device 5/20/2019  9:00 AM   Catheter Care Performed yes 5/20/2019  9:00 AM   Reason for  "Continuing Urinary Catheterization Post operative 5/20/2019  9:00 AM   CAUTI Prevention Bundle StatLock in place w 1" slack;Intact seal between catheter & drainage tubing;Drainage bag off the floor;Green sheeting clip in use;No dependent loops or kinks;Drainage bag below bladder;Drainage bag not overfilled (<2/3 full) 5/20/2019  9:00 AM   Output (mL) 250 mL 5/20/2019  4:09 AM       Neurosurgery Physical Exam    General: well developed, well nourished, no distress.   Head: normocephalic, atraumatic  Neurologic: Alert and oriented. Thought content appropriate.  GCS: Motor: 6/Verbal: 5/Eyes: 4 GCS Total: 15  Mental Status: Awake, Alert, Oriented x 4  Language: No aphasia  Speech: No dysarthria  Cranial nerves: face symmetric, tongue midline, CN II-XII grossly intact.   Eyes: pupils equal, round, reactive to light with accomodation, EOMI.  Pulmonary: normal respirations, no signs of respiratory distress  Abdomen: soft, non-distended, not tender to palpation  Sensory: intact to light touch throughout  Motor Strength: Moves all extremities spontaneously with good tone.  Full strength upper and lower extremities. No abnormal movements seen.     Strength  Deltoids Triceps Biceps Wrist Extension Wrist Flexion Hand    Upper: R 5/5 5/5 5/5 5/5 5/5 5/5    L 5/5 5/5 5/5 5/5 5/5 5/5     Iliopsoas Quadriceps Knee  Flexion Tibialis  anterior Gastro- cnemius EHL   Lower: R 5/5 5/5 5/5 5/5 5/5 5/5    L 5/5 5/5 5/5 5/5 5/5 5/5     Jose: absent  Clonus: absent  Babinski: absent  Pulses: 2+ and symmetric radial and dorsalis pedis.  Skin: Skin is warm, dry and intact.  Incision covered with dressing. No surrounding erythema or edema. No active drainage. HV drain in place.   LSO brace in place.       Significant Labs:  Recent Labs   Lab 05/19/19  1608 05/20/19  0434   * 118*    136   K 4.4 4.7    104   CO2 21* 20*   BUN 36* 24*   CREATININE 2.6* 1.4   CALCIUM 9.0 8.8     Recent Labs   Lab 05/19/19  1608 " 05/20/19  0434   WBC 6.28 9.89   HGB 13.3 14.1   HCT 40.7 40.4    156     Recent Labs   Lab 05/19/19  1608   INR 0.9   APTT 23.6     Microbiology Results (last 7 days)     ** No results found for the last 168 hours. **        Recent Lab Results       05/20/19  0434   05/19/19  1608        Albumin   3.1     Alkaline Phosphatase   45     ALT   8     Anion Gap 12 11     Aniso Slight       aPTT   23.6  Comment:  aPTT therapeutic range = 39-69 seconds     AST   17     Baso # 0.01 0.01     Basophil% 0.1 0.2     BILIRUBIN TOTAL   0.2  Comment:  For infants and newborns, interpretation of results should be based  on gestational age, weight and in agreement with clinical  observations.  Premature Infant recommended reference ranges:  Up to 24 hours.............<8.0 mg/dL  Up to 48 hours............<12.0 mg/dL  3-5 days..................<15.0 mg/dL  6-29 days.................<15.0 mg/dL       BUN, Bld 24 36     Calcium 8.8 9.0     Chloride 104 106     CO2 20 21     Creatinine 1.4 2.6     Differential Method Automated Automated     eGFR if  55.4 26.2     eGFR if non  48.0  Comment:  Calculation used to obtain the estimated glomerular filtration  rate (eGFR) is the CKD-EPI equation.    22.7  Comment:  Calculation used to obtain the estimated glomerular filtration  rate (eGFR) is the CKD-EPI equation.        Eos # 0.0 0.0     Eosinophil% 0.0 0.2     Glucose 118 112     Gran # (ANC) 8.1 5.5     Gran% 82.3 87.9     Group & Rh   A POS     Hematocrit 40.4 40.7     Hemoglobin 14.1 13.3     Hypo Occasional       Immature Grans (Abs) 0.07  Comment:  Mild elevation in immature granulocytes is non specific and   can be seen in a variety of conditions including stress response,   acute inflammation, trauma and pregnancy. Correlation with other   laboratory and clinical findings is essential.   0.02  Comment:  Mild elevation in immature granulocytes is non specific and   can be seen in a variety of  conditions including stress response,   acute inflammation, trauma and pregnancy. Correlation with other   laboratory and clinical findings is essential.       Immature Granulocytes 0.7 0.3     INDIRECT ARA   NEG     Coumadin Monitoring INR   0.9  Comment:  Coumadin Therapy:  2.0 - 3.0 for INR for all indicators except mechanical heart valves  and antiphospholipid syndromes which should use 2.5 - 3.5.       Lymph # 1.3 0.6     Lymph% 12.9 10.0     MCH 30.1 29.8     MCHC 34.9 32.7     MCV 86 91     Mono # 0.4 0.1     Mono% 4.0 1.4     MPV SEE COMMENT  Comment:  Result not available. 10.9     nRBC 0 0     Ovalocytes Occasional       Platelets 156 227     Poik Slight       Poly Occasional       Potassium 4.7 4.4     PROTEIN TOTAL   7.0     Protime   9.9     RBC 4.68 4.46     RDW 14.9 15.1     Sodium 136 138     Tear Drop Cells Occasional       WBC 9.89 6.28         All pertinent labs from the last 24 hours have been reviewed.    Significant Diagnostics:  All pertinent imaging reviewed.

## 2019-05-21 LAB
ANION GAP SERPL CALC-SCNC: 6 MMOL/L (ref 8–16)
BASOPHILS # BLD AUTO: 0.03 K/UL (ref 0–0.2)
BASOPHILS NFR BLD: 0.5 % (ref 0–1.9)
BUN SERPL-MCNC: 15 MG/DL (ref 6–20)
CALCIUM SERPL-MCNC: 8.1 MG/DL (ref 8.7–10.5)
CHLORIDE SERPL-SCNC: 106 MMOL/L (ref 95–110)
CO2 SERPL-SCNC: 24 MMOL/L (ref 23–29)
CREAT SERPL-MCNC: 0.9 MG/DL (ref 0.5–1.4)
DIFFERENTIAL METHOD: ABNORMAL
EOSINOPHIL # BLD AUTO: 0.1 K/UL (ref 0–0.5)
EOSINOPHIL NFR BLD: 1.9 % (ref 0–8)
ERYTHROCYTE [DISTWIDTH] IN BLOOD BY AUTOMATED COUNT: 15.3 % (ref 11.5–14.5)
EST. GFR  (AFRICAN AMERICAN): >60 ML/MIN/1.73 M^2
EST. GFR  (NON AFRICAN AMERICAN): >60 ML/MIN/1.73 M^2
GLUCOSE SERPL-MCNC: 76 MG/DL (ref 70–110)
HCT VFR BLD AUTO: 33.6 % (ref 37–48.5)
HGB BLD-MCNC: 11.2 G/DL (ref 12–16)
IMM GRANULOCYTES # BLD AUTO: 0.02 K/UL (ref 0–0.04)
IMM GRANULOCYTES NFR BLD AUTO: 0.3 % (ref 0–0.5)
LYMPHOCYTES # BLD AUTO: 2.9 K/UL (ref 1–4.8)
LYMPHOCYTES NFR BLD: 46.2 % (ref 18–48)
MCH RBC QN AUTO: 29.6 PG (ref 27–31)
MCHC RBC AUTO-ENTMCNC: 33.3 G/DL (ref 32–36)
MCV RBC AUTO: 89 FL (ref 82–98)
MONOCYTES # BLD AUTO: 0.8 K/UL (ref 0.3–1)
MONOCYTES NFR BLD: 11.8 % (ref 4–15)
NEUTROPHILS # BLD AUTO: 2.5 K/UL (ref 1.8–7.7)
NEUTROPHILS NFR BLD: 39.3 % (ref 38–73)
NRBC BLD-RTO: 0 /100 WBC
PLATELET # BLD AUTO: 189 K/UL (ref 150–350)
PMV BLD AUTO: 11.8 FL (ref 9.2–12.9)
POTASSIUM SERPL-SCNC: 4.3 MMOL/L (ref 3.5–5.1)
RBC # BLD AUTO: 3.78 M/UL (ref 4–5.4)
SODIUM SERPL-SCNC: 136 MMOL/L (ref 136–145)
WBC # BLD AUTO: 6.34 K/UL (ref 3.9–12.7)

## 2019-05-21 PROCEDURE — 97116 GAIT TRAINING THERAPY: CPT

## 2019-05-21 PROCEDURE — 25000003 PHARM REV CODE 250: Performed by: PHYSICIAN ASSISTANT

## 2019-05-21 PROCEDURE — 63600175 PHARM REV CODE 636 W HCPCS: Performed by: PHYSICIAN ASSISTANT

## 2019-05-21 PROCEDURE — 25000003 PHARM REV CODE 250: Performed by: STUDENT IN AN ORGANIZED HEALTH CARE EDUCATION/TRAINING PROGRAM

## 2019-05-21 PROCEDURE — 99024 POSTOP FOLLOW-UP VISIT: CPT | Mod: ,,, | Performed by: PHYSICIAN ASSISTANT

## 2019-05-21 PROCEDURE — 63600175 PHARM REV CODE 636 W HCPCS: Performed by: STUDENT IN AN ORGANIZED HEALTH CARE EDUCATION/TRAINING PROGRAM

## 2019-05-21 PROCEDURE — 99024 PR POST-OP FOLLOW-UP VISIT: ICD-10-PCS | Mod: ,,, | Performed by: PHYSICIAN ASSISTANT

## 2019-05-21 PROCEDURE — 36415 COLL VENOUS BLD VENIPUNCTURE: CPT

## 2019-05-21 PROCEDURE — 11000001 HC ACUTE MED/SURG PRIVATE ROOM

## 2019-05-21 PROCEDURE — 80048 BASIC METABOLIC PNL TOTAL CA: CPT

## 2019-05-21 PROCEDURE — 85025 COMPLETE CBC W/AUTO DIFF WBC: CPT

## 2019-05-21 RX ORDER — BACITRACIN ZINC 500 UNIT/G
OINTMENT (GRAM) TOPICAL 2 TIMES DAILY
Status: DISCONTINUED | OUTPATIENT
Start: 2019-05-21 | End: 2019-05-22 | Stop reason: HOSPADM

## 2019-05-21 RX ORDER — CEFAZOLIN SODIUM 1 G/3ML
2 INJECTION, POWDER, FOR SOLUTION INTRAMUSCULAR; INTRAVENOUS
Status: DISCONTINUED | OUTPATIENT
Start: 2019-05-21 | End: 2019-05-22 | Stop reason: HOSPADM

## 2019-05-21 RX ORDER — PANTOPRAZOLE SODIUM 40 MG/1
40 TABLET, DELAYED RELEASE ORAL DAILY
Status: DISCONTINUED | OUTPATIENT
Start: 2019-05-22 | End: 2019-05-21

## 2019-05-21 RX ORDER — PANTOPRAZOLE SODIUM 40 MG/1
40 TABLET, DELAYED RELEASE ORAL DAILY
Status: DISCONTINUED | OUTPATIENT
Start: 2019-05-21 | End: 2019-05-22 | Stop reason: HOSPADM

## 2019-05-21 RX ADMIN — HEPARIN SODIUM 5000 UNITS: 5000 INJECTION, SOLUTION INTRAVENOUS; SUBCUTANEOUS at 06:05

## 2019-05-21 RX ADMIN — HEPARIN SODIUM 5000 UNITS: 5000 INJECTION, SOLUTION INTRAVENOUS; SUBCUTANEOUS at 10:05

## 2019-05-21 RX ADMIN — TAMSULOSIN HYDROCHLORIDE 0.4 MG: 0.4 CAPSULE ORAL at 09:05

## 2019-05-21 RX ADMIN — HYDROCODONE BITARTRATE AND ACETAMINOPHEN 1 TABLET: 10; 325 TABLET ORAL at 09:05

## 2019-05-21 RX ADMIN — ALUMINUM HYDROXIDE, MAGNESIUM HYDROXIDE, AND SIMETHICONE 30 ML: 200; 200; 20 SUSPENSION ORAL at 10:05

## 2019-05-21 RX ADMIN — CEFAZOLIN 2 G: 1 INJECTION, POWDER, FOR SOLUTION INTRAMUSCULAR; INTRAVENOUS at 08:05

## 2019-05-21 RX ADMIN — HYDROCODONE BITARTRATE AND ACETAMINOPHEN 1 TABLET: 10; 325 TABLET ORAL at 02:05

## 2019-05-21 RX ADMIN — PANTOPRAZOLE SODIUM 40 MG: 40 TABLET, DELAYED RELEASE ORAL at 08:05

## 2019-05-21 RX ADMIN — MUPIROCIN 1 G: 20 OINTMENT TOPICAL at 08:05

## 2019-05-21 RX ADMIN — BACITRACIN: 500 OINTMENT TOPICAL at 08:05

## 2019-05-21 RX ADMIN — CEFAZOLIN 2 G: 1 INJECTION, POWDER, FOR SOLUTION INTRAMUSCULAR; INTRAVENOUS at 03:05

## 2019-05-21 RX ADMIN — MUPIROCIN 1 G: 20 OINTMENT TOPICAL at 09:05

## 2019-05-21 RX ADMIN — BACITRACIN: 500 OINTMENT TOPICAL at 03:05

## 2019-05-21 RX ADMIN — HYDROCODONE BITARTRATE AND ACETAMINOPHEN 1 TABLET: 10; 325 TABLET ORAL at 08:05

## 2019-05-21 RX ADMIN — HEPARIN SODIUM 5000 UNITS: 5000 INJECTION, SOLUTION INTRAVENOUS; SUBCUTANEOUS at 02:05

## 2019-05-21 NOTE — SUBJECTIVE & OBJECTIVE
Interval History: Hypotensive overnight, asymptomatic. Will continue to monitor. Pain controlled with current regimen. Continued saddle anesthesia. Nash in place, voiding trial today. Drain output 155cc in past 24 hrs, 80 overnight. Kidney function labs within normal range today, will ctm. PT/OT rec outpatient therapy. Discharge pending drain removal.     Medications:  Continuous Infusions:  Scheduled Meds:   bisacodyl  10 mg Rectal Daily    heparin (porcine)  5,000 Units Subcutaneous Q8H    mupirocin  1 g Nasal BID    tamsulosin  0.4 mg Oral Daily     PRN Meds:acetaminophen, aluminum-magnesium hydroxide-simethicone, dextrose 50%, dextrose 50%, diazePAM, glucagon (human recombinant), glucose, glucose, glucose, HYDROcodone-acetaminophen, insulin aspart U-100, morphine, ondansetron, promethazine (PHENERGAN) IVPB, senna-docusate 8.6-50 mg, sodium chloride 0.9%     Objective:     Weight: 92.2 kg (203 lb 4.2 oz)  Body mass index is 33.82 kg/m².  Vital Signs (Most Recent):  Temp: 97.3 °F (36.3 °C) (05/21/19 1139)  Pulse: 94 (05/21/19 1139)  Resp: 17 (05/21/19 1139)  BP: (!) 101/54 (05/21/19 1139)  SpO2: 98 % (05/21/19 1139) Vital Signs (24h Range):  Temp:  [96.7 °F (35.9 °C)-98.6 °F (37 °C)] 97.3 °F (36.3 °C)  Pulse:  [75-94] 94  Resp:  [17-18] 17  SpO2:  [97 %-100 %] 98 %  BP: ()/(48-54) 101/54                          Closed/Suction Drain 05/19/19 2304 Right Back Bulb 15 Fr. (Active)   Site Description Edema/swelling 5/20/2019  9:00 AM   Dressing Type Transparent 5/20/2019  9:00 AM   Dressing Status Clean;Dry;Intact 5/20/2019  9:00 AM   Drainage Serosanguineous 5/20/2019  9:00 AM   Status To bulb suction 5/20/2019  9:00 AM   Output (mL) 30 mL 5/21/2019  4:00 AM            Urethral Catheter 05/19/19 Latex 16 Fr. (Active)   Site Assessment Clean;Intact 5/20/2019  7:30 PM   Collection Container Urimeter 5/20/2019  7:30 PM   Securement Method secured to top of thigh w/ adhesive device 5/20/2019  9:00 AM  "  Catheter Care Performed yes 5/20/2019  9:00 AM   Reason for Continuing Urinary Catheterization Post operative 5/20/2019  9:00 AM   CAUTI Prevention Bundle StatLock in place w 1" slack;Intact seal between catheter & drainage tubing;Drainage bag off the floor;Green sheeting clip in use;No dependent loops or kinks;Drainage bag below bladder;Drainage bag not overfilled (<2/3 full) 5/20/2019  9:00 AM   Output (mL) 500 mL 5/21/2019  4:00 AM       Neurosurgery Physical Exam    General: well developed, well nourished, no distress.   Head: normocephalic, atraumatic  Neurologic: Alert and oriented. Thought content appropriate.  GCS: Motor: 6/Verbal: 5/Eyes: 4 GCS Total: 15  Mental Status: Awake, Alert, Oriented x 4  Language: No aphasia  Speech: No dysarthria  Cranial nerves: face symmetric, tongue midline, CN II-XII grossly intact, EOMI.  Pulmonary: normal respirations, no signs of respiratory distress  Abdomen: soft, non-distended, not tender to palpation  Sensory: intact to light touch throughout  Motor Strength: Moves all extremities spontaneously with good tone.  Full strength upper and lower extremities. No abnormal movements seen.   Jose: absent  Clonus: absent  Babinski: absent  Pulses: 2+ and symmetric radial and dorsalis pedis.  Skin: Skin is warm, dry and intact.  Incision c/d/i without surrounding erythema or edema. TRACEY drain in place. No drainage from incision.     Significant Labs:  Recent Labs   Lab 05/19/19  1608 05/20/19  0434 05/21/19  0432   * 118* 76    136 136   K 4.4 4.7 4.3    104 106   CO2 21* 20* 24   BUN 36* 24* 15   CREATININE 2.6* 1.4 0.9   CALCIUM 9.0 8.8 8.1*     Recent Labs   Lab 05/19/19  1608 05/20/19  0434 05/21/19  0432   WBC 6.28 9.89 6.34   HGB 13.3 14.1 11.2*   HCT 40.7 40.4 33.6*    156 189     Recent Labs   Lab 05/19/19  1608   INR 0.9   APTT 23.6     Microbiology Results (last 7 days)     ** No results found for the last 168 hours. **        Recent Lab " Results       05/21/19  0432        Anion Gap 6     Baso # 0.03     Basophil% 0.5     BUN, Bld 15     Calcium 8.1     Chloride 106     CO2 24     Creatinine 0.9     Differential Method Automated     eGFR if African American >60.0     eGFR if non  >60.0  Comment:  Calculation used to obtain the estimated glomerular filtration  rate (eGFR) is the CKD-EPI equation.        Eos # 0.1     Eosinophil% 1.9     Glucose 76     Gran # (ANC) 2.5     Gran% 39.3     Hematocrit 33.6     Hemoglobin 11.2     Immature Grans (Abs) 0.02  Comment:  Mild elevation in immature granulocytes is non specific and   can be seen in a variety of conditions including stress response,   acute inflammation, trauma and pregnancy. Correlation with other   laboratory and clinical findings is essential.       Immature Granulocytes 0.3     Lymph # 2.9     Lymph% 46.2     MCH 29.6     MCHC 33.3     MCV 89     Mono # 0.8     Mono% 11.8     MPV 11.8     nRBC 0     Platelets 189     Potassium 4.3     RBC 3.78     RDW 15.3     Sodium 136     WBC 6.34         All pertinent labs from the last 24 hours have been reviewed.    Significant Diagnostics:  All pertinent imaging reviewed.

## 2019-05-21 NOTE — PLAN OF CARE
Problem: Pain Acute  Goal: Optimal Pain Control    Intervention: Optimize Psychosocial Wellbeing  Pt continue to be numb in vaginal;and rectal area.

## 2019-05-21 NOTE — PLAN OF CARE
Problem: Physical Therapy Goal  Goal: Physical Therapy Goal  Goals to be met by: 6/3/19    Patient will increase functional independence with mobility by performin. Supine to sit with supervision  2. Sit to stand transfer with supervision  3. Gait x200 feet with supervision using DME as needed  4. Ascend/descend 4 stairs with bilateral Handrails with supervision     Outcome: Ongoing (interventions implemented as appropriate)    Continue POC.

## 2019-05-21 NOTE — PROGRESS NOTES
[facility]  Neurosurgery  Progress Note    Subjective:     History of Present Illness: 39F w/ PMH smoking but no other contributory medical conditions who presents from OSH w/ cauda equina syndrome from L5/S1 HNP. Patient states that she has had LBP radiating into her BLE to the knee since December. Last week she fell on her back in the bathtub and her pain has been progressively worse since. Yesterday she realized that she was having difficulty with urination, abdominal pain and was unable to control her voiding. She presented to [facility] where she was evaluated and a padgett was placed w/ 1500mL of urine returned. She was transferred to [facility] ED where MRI shows L5/S1 HNP. Additionally, she describes saddle anesthesia. ROS is otherwise negative.    Post-Op Info:  Procedure(s) (LRB):  LAMINECTOMY, SPINE, LUMBAR, WITH FUSION (L5/S1) MIS laminectomy Microscope, metrix set (do not open) Class A (N/A)   2 Days Post-Op     Interval History: Hypotensive overnight, asymptomatic. Will continue to monitor. Pain controlled with current regimen. Continued saddle anesthesia. Padgett in place, voiding trial today. Drain output 155cc in past 24 hrs, 80 overnight. Kidney function labs within normal range today, will ctm. PT/OT rec outpatient therapy. Discharge pending drain removal.     Medications:  Continuous Infusions:  Scheduled Meds:   bisacodyl  10 mg Rectal Daily    heparin (porcine)  5,000 Units Subcutaneous Q8H    mupirocin  1 g Nasal BID    tamsulosin  0.4 mg Oral Daily     PRN Meds:acetaminophen, aluminum-magnesium hydroxide-simethicone, dextrose 50%, dextrose 50%, diazePAM, glucagon (human recombinant), glucose, glucose, glucose, HYDROcodone-acetaminophen, insulin aspart U-100, morphine, ondansetron, promethazine (PHENERGAN) IVPB, senna-docusate 8.6-50 mg, sodium chloride 0.9%     Objective:     Weight: 92.2 kg (203 lb 4.2 oz)  Body mass index is 33.82 kg/m².  Vital Signs (Most Recent):  Temp: 97.3 °F  "(36.3 °C) (05/21/19 1139)  Pulse: 94 (05/21/19 1139)  Resp: 17 (05/21/19 1139)  BP: (!) 101/54 (05/21/19 1139)  SpO2: 98 % (05/21/19 1139) Vital Signs (24h Range):  Temp:  [96.7 °F (35.9 °C)-98.6 °F (37 °C)] 97.3 °F (36.3 °C)  Pulse:  [75-94] 94  Resp:  [17-18] 17  SpO2:  [97 %-100 %] 98 %  BP: ()/(48-54) 101/54                          Closed/Suction Drain 05/19/19 2304 Right Back Bulb 15 Fr. (Active)   Site Description Edema/swelling 5/20/2019  9:00 AM   Dressing Type Transparent 5/20/2019  9:00 AM   Dressing Status Clean;Dry;Intact 5/20/2019  9:00 AM   Drainage Serosanguineous 5/20/2019  9:00 AM   Status To bulb suction 5/20/2019  9:00 AM   Output (mL) 30 mL 5/21/2019  4:00 AM            Urethral Catheter 05/19/19 Latex 16 Fr. (Active)   Site Assessment Clean;Intact 5/20/2019  7:30 PM   Collection Container Urimeter 5/20/2019  7:30 PM   Securement Method secured to top of thigh w/ adhesive device 5/20/2019  9:00 AM   Catheter Care Performed yes 5/20/2019  9:00 AM   Reason for Continuing Urinary Catheterization Post operative 5/20/2019  9:00 AM   CAUTI Prevention Bundle StatLock in place w 1" slack;Intact seal between catheter & drainage tubing;Drainage bag off the floor;Green sheeting clip in use;No dependent loops or kinks;Drainage bag below bladder;Drainage bag not overfilled (<2/3 full) 5/20/2019  9:00 AM   Output (mL) 500 mL 5/21/2019  4:00 AM       Neurosurgery Physical Exam    General: well developed, well nourished, no distress.   Head: normocephalic, atraumatic  Neurologic: Alert and oriented. Thought content appropriate.  GCS: Motor: 6/Verbal: 5/Eyes: 4 GCS Total: 15  Mental Status: Awake, Alert, Oriented x 4  Language: No aphasia  Speech: No dysarthria  Cranial nerves: face symmetric, tongue midline, CN II-XII grossly intact, EOMI.  Pulmonary: normal respirations, no signs of respiratory distress  Abdomen: soft, non-distended, not tender to palpation  Sensory: intact to light touch " throughout  Motor Strength: Moves all extremities spontaneously with good tone.  Full strength upper and lower extremities. No abnormal movements seen.   Jose: absent  Clonus: absent  Babinski: absent  Pulses: 2+ and symmetric radial and dorsalis pedis.  Skin: Skin is warm, dry and intact.  Incision c/d/i without surrounding erythema or edema. TRACEY drain in place. No drainage from incision.     Significant Labs:  Recent Labs   Lab 05/19/19  1608 05/20/19  0434 05/21/19  0432   * 118* 76    136 136   K 4.4 4.7 4.3    104 106   CO2 21* 20* 24   BUN 36* 24* 15   CREATININE 2.6* 1.4 0.9   CALCIUM 9.0 8.8 8.1*     Recent Labs   Lab 05/19/19  1608 05/20/19  0434 05/21/19  0432   WBC 6.28 9.89 6.34   HGB 13.3 14.1 11.2*   HCT 40.7 40.4 33.6*    156 189     Recent Labs   Lab 05/19/19  1608   INR 0.9   APTT 23.6     Microbiology Results (last 7 days)     ** No results found for the last 168 hours. **        Recent Lab Results       05/21/19  0432        Anion Gap 6     Baso # 0.03     Basophil% 0.5     BUN, Bld 15     Calcium 8.1     Chloride 106     CO2 24     Creatinine 0.9     Differential Method Automated     eGFR if African American >60.0     eGFR if non  >60.0  Comment:  Calculation used to obtain the estimated glomerular filtration  rate (eGFR) is the CKD-EPI equation.        Eos # 0.1     Eosinophil% 1.9     Glucose 76     Gran # (ANC) 2.5     Gran% 39.3     Hematocrit 33.6     Hemoglobin 11.2     Immature Grans (Abs) 0.02  Comment:  Mild elevation in immature granulocytes is non specific and   can be seen in a variety of conditions including stress response,   acute inflammation, trauma and pregnancy. Correlation with other   laboratory and clinical findings is essential.       Immature Granulocytes 0.3     Lymph # 2.9     Lymph% 46.2     MCH 29.6     MCHC 33.3     MCV 89     Mono # 0.8     Mono% 11.8     MPV 11.8     nRBC 0     Platelets 189     Potassium 4.3     RBC 3.78      RDW 15.3     Sodium 136     WBC 6.34         All pertinent labs from the last 24 hours have been reviewed.    Significant Diagnostics:  All pertinent imaging reviewed.     Assessment/Plan:     Cauda equina syndrome  39F w/ PMH smoking but no other contributory medical conditions who presents from OSH w/ cauda equina syndrome from L5/S1 HNP. MRI w/ L5/S1 HNP. Now s/p MIS L5/S1 laminectomy/discectomy on 5/19.    - Doing well postoperatively. Neurologically stable  - Hypotensive overnight and throughout today. Asymptomatic. H/H stable postop.   - Kidney function labs within normal range, will ctm. Remove padgett today, will monitor for urinary retention. Bladder scan prn.   - TRACEY drain output 80cc overnight, continue. Continue antibiotic ppx while drain in place.   - Pain controlled with current regimen.  - Leave incision open to air. Bacitracin BID.  - LSO brace when up/OOB.  - Activity as tolerated. Continue PT/OT/OOB. OOB > 6hrs/day  - GI: Diet as tolerated. Continue bowel regimen.   - DVT ppx: SQH, Compression stockings, SCDs  - Atelectasis ppx: IS at bedside. Encourage use every hour while awake.  - Call with questions or concerns.   - PT/OT rec outpatient therapy. Discharge pending drain removal.               Betty Villalobos PA-C  Neurosurgery  [facility]

## 2019-05-21 NOTE — PT/OT/SLP PROGRESS
Physical Therapy Treatment    Patient Name:  Leti Babb   MRN:  [id number]    Recommendations:     Discharge Recommendations:  outpatient PT   Discharge Equipment Recommendations: walker, rolling   Barriers to discharge: Inaccessible home    Assessment:     Leti Babb is a 39 y.o. female admitted with a medical diagnosis of s/p L5-S1 laminectomy .  She presents with the following impairments/functional limitations:  weakness, impaired endurance, impaired functional mobilty, gait instability, impaired balance, pain, decreased lower extremity function, orthopedic precautions. Patient with increased pain and stiffness during PT session today. She ambulated 60ft with RW and SBA. She was CGA for sit to supine. She was able to recall spinal precautions. She would continue to benefit from PT in order to get back to PLOF.     Rehab Prognosis: Good; patient would benefit from acute skilled PT services to address these deficits and reach maximum level of function.    Recent Surgery: Procedure(s) (LRB):  LAMINECTOMY, SPINE, LUMBAR, WITH FUSION (L5/S1) MIS laminectomy Microscope, metrix set (do not open) Class A (N/A) 2 Days Post-Op    Plan:     During this hospitalization, patient to be seen 5 x/week to address the identified rehab impairments via gait training, therapeutic activities, therapeutic exercises and progress toward the following goals:    · Plan of Care Expires:  06/03/19    Subjective     Chief Complaint: About to fall asleep in the chair.   Patient/Family Comments/goals: To get back to PLOF.   Pain/Comfort:  · Pain Rating 1: 8/10  · Location - Orientation 1: lower  · Location 1: back  · Pain Addressed 1: Reposition, Distraction  · Pain Rating Post-Intervention 1: 8/10      Objective:     Communicated with RN prior to session.  Patient found up in chair with TRACEY drain (LSO brace) upon PT entry to room.     General Precautions: Standard, fall   Orthopedic Precautions:spinal precautions   Braces: LSO      Functional Mobility:  · Bed Mobility:     · Rolling Right: stand by assistance  · Sit to Supine: contact guard assistance to manage B LE's  · Transfers:     · Sit to Stand:  stand by assistance with rolling walker x1 from bedside chair   · Gait: Patient ambulated 60ft with stand by assistance and rolling walker. Patient limited in ambulation distance today due to pain.     AM-PAC 6 CLICK MOBILITY  Turning over in bed (including adjusting bedclothes, sheets and blankets)?: 4  Sitting down on and standing up from a chair with arms (e.g., wheelchair, bedside commode, etc.): 4  Moving from lying on back to sitting on the side of the bed?: 3  Moving to and from a bed to a chair (including a wheelchair)?: 3  Need to walk in hospital room?: 3  Climbing 3-5 steps with a railing?: 3  Basic Mobility Total Score: 20     Patient left in right sidelying with all lines intact, call button in reach and RN notified.    GOALS:   Multidisciplinary Problems     Physical Therapy Goals        Problem: Physical Therapy Goal    Goal Priority Disciplines Outcome Goal Variances Interventions   Physical Therapy Goal     PT, PT/OT Ongoing (interventions implemented as appropriate)     Description:  Goals to be met by: 6/3/19    Patient will increase functional independence with mobility by performin. Supine to sit with supervision  2. Sit to stand transfer with supervision  3. Gait x200 feet with supervision using DME as needed  4. Ascend/descend 4 stairs with bilateral Handrails with supervision                      Time Tracking:     PT Received On: 19  PT Start Time: 1355     PT Stop Time: 1410  PT Total Time (min): 15 min     Billable Minutes: Gait Training 15    Treatment Type: Treatment  PT/PTA: PT       Nelly Granados, PT  2019

## 2019-05-22 VITALS
OXYGEN SATURATION: 98 % | WEIGHT: 203.25 LBS | TEMPERATURE: 98 F | DIASTOLIC BLOOD PRESSURE: 53 MMHG | RESPIRATION RATE: 18 BRPM | SYSTOLIC BLOOD PRESSURE: 107 MMHG | HEIGHT: 65 IN | BODY MASS INDEX: 33.86 KG/M2 | HEART RATE: 74 BPM

## 2019-05-22 LAB
ANION GAP SERPL CALC-SCNC: 5 MMOL/L (ref 8–16)
BASOPHILS # BLD AUTO: 0.02 K/UL (ref 0–0.2)
BASOPHILS NFR BLD: 0.3 % (ref 0–1.9)
BUN SERPL-MCNC: 14 MG/DL (ref 6–20)
CALCIUM SERPL-MCNC: 7.9 MG/DL (ref 8.7–10.5)
CHLORIDE SERPL-SCNC: 103 MMOL/L (ref 95–110)
CO2 SERPL-SCNC: 29 MMOL/L (ref 23–29)
CREAT SERPL-MCNC: 0.8 MG/DL (ref 0.5–1.4)
DIFFERENTIAL METHOD: ABNORMAL
EOSINOPHIL # BLD AUTO: 0.4 K/UL (ref 0–0.5)
EOSINOPHIL NFR BLD: 6.3 % (ref 0–8)
ERYTHROCYTE [DISTWIDTH] IN BLOOD BY AUTOMATED COUNT: 15.1 % (ref 11.5–14.5)
EST. GFR  (AFRICAN AMERICAN): >60 ML/MIN/1.73 M^2
EST. GFR  (NON AFRICAN AMERICAN): >60 ML/MIN/1.73 M^2
GLUCOSE SERPL-MCNC: 78 MG/DL (ref 70–110)
HCT VFR BLD AUTO: 33.1 % (ref 37–48.5)
HGB BLD-MCNC: 10.9 G/DL (ref 12–16)
IMM GRANULOCYTES # BLD AUTO: 0.01 K/UL (ref 0–0.04)
IMM GRANULOCYTES NFR BLD AUTO: 0.2 % (ref 0–0.5)
LYMPHOCYTES # BLD AUTO: 3 K/UL (ref 1–4.8)
LYMPHOCYTES NFR BLD: 47.2 % (ref 18–48)
MCH RBC QN AUTO: 29.5 PG (ref 27–31)
MCHC RBC AUTO-ENTMCNC: 32.9 G/DL (ref 32–36)
MCV RBC AUTO: 90 FL (ref 82–98)
MONOCYTES # BLD AUTO: 0.8 K/UL (ref 0.3–1)
MONOCYTES NFR BLD: 11.8 % (ref 4–15)
NEUTROPHILS # BLD AUTO: 2.2 K/UL (ref 1.8–7.7)
NEUTROPHILS NFR BLD: 34.2 % (ref 38–73)
NRBC BLD-RTO: 0 /100 WBC
PLATELET # BLD AUTO: 191 K/UL (ref 150–350)
PMV BLD AUTO: 11.5 FL (ref 9.2–12.9)
POTASSIUM SERPL-SCNC: 4.3 MMOL/L (ref 3.5–5.1)
RBC # BLD AUTO: 3.69 M/UL (ref 4–5.4)
SODIUM SERPL-SCNC: 137 MMOL/L (ref 136–145)
WBC # BLD AUTO: 6.35 K/UL (ref 3.9–12.7)

## 2019-05-22 PROCEDURE — 63600175 PHARM REV CODE 636 W HCPCS: Performed by: PHYSICIAN ASSISTANT

## 2019-05-22 PROCEDURE — 80048 BASIC METABOLIC PNL TOTAL CA: CPT

## 2019-05-22 PROCEDURE — 99024 POSTOP FOLLOW-UP VISIT: CPT | Mod: ,,, | Performed by: PHYSICIAN ASSISTANT

## 2019-05-22 PROCEDURE — 85025 COMPLETE CBC W/AUTO DIFF WBC: CPT

## 2019-05-22 PROCEDURE — 36415 COLL VENOUS BLD VENIPUNCTURE: CPT

## 2019-05-22 PROCEDURE — 63600175 PHARM REV CODE 636 W HCPCS: Performed by: STUDENT IN AN ORGANIZED HEALTH CARE EDUCATION/TRAINING PROGRAM

## 2019-05-22 PROCEDURE — 99024 PR POST-OP FOLLOW-UP VISIT: ICD-10-PCS | Mod: ,,, | Performed by: PHYSICIAN ASSISTANT

## 2019-05-22 PROCEDURE — 25000003 PHARM REV CODE 250: Performed by: STUDENT IN AN ORGANIZED HEALTH CARE EDUCATION/TRAINING PROGRAM

## 2019-05-22 PROCEDURE — 25000003 PHARM REV CODE 250: Performed by: PHYSICIAN ASSISTANT

## 2019-05-22 RX ORDER — DIAZEPAM 5 MG/1
5 TABLET ORAL EVERY 6 HOURS PRN
Qty: 40 TABLET | Refills: 0 | Status: SHIPPED | OUTPATIENT
Start: 2019-05-22 | End: 2020-07-30 | Stop reason: CLARIF

## 2019-05-22 RX ORDER — CALCIUM CARBONATE 500(1250)
1000 TABLET ORAL 2 TIMES DAILY
Status: DISCONTINUED | OUTPATIENT
Start: 2019-05-22 | End: 2019-05-22 | Stop reason: HOSPADM

## 2019-05-22 RX ORDER — TAMSULOSIN HYDROCHLORIDE 0.4 MG/1
0.4 CAPSULE ORAL DAILY
Qty: 21 CAPSULE | Refills: 0 | Status: SHIPPED | OUTPATIENT
Start: 2019-05-23 | End: 2019-06-13

## 2019-05-22 RX ORDER — BACITRACIN ZINC 500 UNIT/G
OINTMENT (GRAM) TOPICAL 2 TIMES DAILY
Refills: 0 | COMMUNITY
Start: 2019-05-22 | End: 2020-07-30 | Stop reason: CLARIF

## 2019-05-22 RX ORDER — CALCIUM CARBONATE 500(1250)
1000 TABLET ORAL 2 TIMES DAILY
Refills: 0 | COMMUNITY
Start: 2019-05-22 | End: 2019-05-29

## 2019-05-22 RX ORDER — HYDROCODONE BITARTRATE AND ACETAMINOPHEN 10; 325 MG/1; MG/1
1 TABLET ORAL EVERY 6 HOURS PRN
Qty: 50 TABLET | Refills: 0 | Status: SHIPPED | OUTPATIENT
Start: 2019-05-22 | End: 2020-07-30 | Stop reason: CLARIF

## 2019-05-22 RX ADMIN — PANTOPRAZOLE SODIUM 40 MG: 40 TABLET, DELAYED RELEASE ORAL at 09:05

## 2019-05-22 RX ADMIN — BACITRACIN: 500 OINTMENT TOPICAL at 09:05

## 2019-05-22 RX ADMIN — CALCIUM 1000 MG: 500 TABLET ORAL at 10:05

## 2019-05-22 RX ADMIN — TAMSULOSIN HYDROCHLORIDE 0.4 MG: 0.4 CAPSULE ORAL at 09:05

## 2019-05-22 RX ADMIN — CEFAZOLIN 2 G: 1 INJECTION, POWDER, FOR SOLUTION INTRAMUSCULAR; INTRAVENOUS at 05:05

## 2019-05-22 RX ADMIN — MUPIROCIN 1 G: 20 OINTMENT TOPICAL at 09:05

## 2019-05-22 RX ADMIN — HEPARIN SODIUM 5000 UNITS: 5000 INJECTION, SOLUTION INTRAVENOUS; SUBCUTANEOUS at 05:05

## 2019-05-22 RX ADMIN — HYDROCODONE BITARTRATE AND ACETAMINOPHEN 1 TABLET: 10; 325 TABLET ORAL at 09:05

## 2019-05-22 NOTE — PROGRESS NOTES
[facility]  Neurosurgery  Progress Note    Subjective:     History of Present Illness: 39F w/ PMH smoking but no other contributory medical conditions who presents from OSH w/ cauda equina syndrome from L5/S1 HNP. Patient states that she has had LBP radiating into her BLE to the knee since December. Last week she fell on her back in the bathtub and her pain has been progressively worse since. Yesterday she realized that she was having difficulty with urination, abdominal pain and was unable to control her voiding. She presented to OS where she was evaluated and a padgett was placed w/ 1500mL of urine returned. She was transferred to [facility] ED where MRI shows L5/S1 HNP. Additionally, she describes saddle anesthesia. ROS is otherwise negative.    Post-Op Info:  Procedure(s) (LRB):  LAMINECTOMY, SPINE, LUMBAR, WITH FUSION (L5/S1) MIS laminectomy Microscope, metrix set (do not open) Class A (N/A)   3 Days Post-Op     Interval History: Continued hypotension, asymptomatic. Drain documented 0 overnight, 25cc in drain at 11am. Drain removed today. Continued saddle anesthesia. Pain controlled. Failed voiding trial yesterday and padgett replaced. Will discharge with padgett, instructed to follow-up with Urology within 1 week for voiding trial. Patient given referral and will have to find Urologist as [facility] does not take her insurance. Referral given for OP PT/OT. Will discharge today with pain medication, Flomax, and appropriate referrals.     Medications:  Continuous Infusions:  Scheduled Meds:   bacitracin   Topical (Top) BID    bisacodyl  10 mg Rectal Daily    calcium carbonate  1,000 mg Oral BID    ceFAZolin (ANCEF) IVPB  2 g Intravenous Q8H    heparin (porcine)  5,000 Units Subcutaneous Q8H    mupirocin  1 g Nasal BID    pantoprazole  40 mg Oral Daily    tamsulosin  0.4 mg Oral Daily     PRN Meds:acetaminophen, aluminum-magnesium hydroxide-simethicone, dextrose 50%, dextrose 50%, diazePAM,  "glucagon (human recombinant), glucose, glucose, glucose, HYDROcodone-acetaminophen, insulin aspart U-100, morphine, ondansetron, promethazine (PHENERGAN) IVPB, senna-docusate 8.6-50 mg, sodium chloride 0.9%       Objective:     Weight: 92.2 kg (203 lb 4.2 oz)  Body mass index is 33.82 kg/m².  Vital Signs (Most Recent):  Temp: 98 °F (36.7 °C) (05/22/19 0400)  Pulse: 74 (05/22/19 0400)  Resp: 18 (05/22/19 0400)  BP: (!) 107/53 (05/22/19 0400)  SpO2: 98 % (05/22/19 0400) Vital Signs (24h Range):  Temp:  [97 °F (36.1 °C)-98 °F (36.7 °C)] 98 °F (36.7 °C)  Pulse:  [74-96] 74  Resp:  [17-18] 18  SpO2:  [96 %-100 %] 98 %  BP: (101-110)/(53-56) 107/53                          Closed/Suction Drain 05/19/19 2304 Right Back Bulb 15 Fr. (Active)   Site Description Unable to view 5/21/2019  8:00 PM   Dressing Type Transparent 5/21/2019  8:00 PM   Dressing Status Clean;Dry;Intact 5/21/2019  8:00 PM   Drainage Bloody 5/21/2019  8:00 PM   Status To bulb suction 5/20/2019  9:00 AM   Output (mL) 0 mL 5/22/2019  5:38 AM            Urethral Catheter 05/21/19 2300 Straight-tip (Active)   Site Assessment Clean;Intact 5/21/2019 11:00 PM   Collection Container Urimeter 5/21/2019 11:00 PM   Securement Method secured to top of thigh w/ adhesive device 5/21/2019 11:00 PM   Catheter Care Performed yes 5/21/2019 11:00 PM   Reason for Continuing Urinary Catheterization Urinary retention 5/21/2019 11:00 PM   CAUTI Prevention Bundle StatLock in place w 1" slack;Intact seal between catheter & drainage tubing;Drainage bag off the floor;Green sheeting clip in use;No dependent loops or kinks;Drainage bag below bladder;Drainage bag not overfilled (<2/3 full) 5/21/2019 11:00 PM   Output (mL) 350 mL 5/22/2019  5:38 AM       Neurosurgery Physical Exam    General: well developed, well nourished, no distress.   Head: normocephalic, atraumatic  Neurologic: Alert and oriented. Thought content appropriate.  GCS: Motor: 6/Verbal: 5/Eyes: 4 GCS Total: 15  Mental " Status: Awake, Alert, Oriented x 4  Language: No aphasia  Speech: No dysarthria  Cranial nerves: face symmetric, tongue midline, CN II-XII grossly intact, EOMI.   Pulmonary: normal respirations, no signs of respiratory distress  Abdomen: soft, non-distended, not tender to palpation  Sensory: intact to light touch throughout  Motor Strength: Moves all extremities spontaneously with good tone.  Full strength upper and lower extremities. No abnormal movements seen.   Jose: absent  Clonus: absent  Babinski: absent  Pulses: 2+ and symmetric radial and dorsalis pedis.  Skin: Skin is warm, dry and intact.  TRACEY drain in place.   Nash in place.   Incision c/d/i with staples approx skin edges. No surrounding erythema or edema. No drainage from incision.     Significant Labs:  Recent Labs   Lab 05/21/19 0432 05/22/19 0331   GLU 76 78    137   K 4.3 4.3    103   CO2 24 29   BUN 15 14   CREATININE 0.9 0.8   CALCIUM 8.1* 7.9*     Recent Labs   Lab 05/21/19 0432 05/22/19 0331   WBC 6.34 6.35   HGB 11.2* 10.9*   HCT 33.6* 33.1*    191     No results for input(s): LABPT, INR, APTT in the last 48 hours.  Microbiology Results (last 7 days)     ** No results found for the last 168 hours. **        Recent Lab Results       05/22/19 0331        Anion Gap 5     Baso # 0.02     Basophil% 0.3     BUN, Bld 14     Calcium 7.9     Chloride 103     CO2 29     Creatinine 0.8     Differential Method Automated     eGFR if African American >60.0     eGFR if non  >60.0  Comment:  Calculation used to obtain the estimated glomerular filtration  rate (eGFR) is the CKD-EPI equation.        Eos # 0.4     Eosinophil% 6.3     Glucose 78     Gran # (ANC) 2.2     Gran% 34.2     Hematocrit 33.1     Hemoglobin 10.9     Immature Grans (Abs) 0.01  Comment:  Mild elevation in immature granulocytes is non specific and   can be seen in a variety of conditions including stress response,   acute inflammation, trauma and  pregnancy. Correlation with other   laboratory and clinical findings is essential.       Immature Granulocytes 0.2     Lymph # 3.0     Lymph% 47.2     MCH 29.5     MCHC 32.9     MCV 90     Mono # 0.8     Mono% 11.8     MPV 11.5     nRBC 0     Platelets 191     Potassium 4.3     RBC 3.69     RDW 15.1     Sodium 137     WBC 6.35         All pertinent labs from the last 24 hours have been reviewed.    Significant Diagnostics:  No new imaging to review.     Assessment/Plan:     * Cauda equina syndrome  39F w/ PMH smoking but no other contributory medical conditions who presents from OSH w/ cauda equina syndrome from L5/S1 HNP. MRI w/ L5/S1 HNP. Now s/p MIS L5/S1 laminectomy/discectomy on 5/19.    - Doing well postoperatively. Neurologically stable  - Hypotensive overnight and throughout today. Asymptomatic. H/H stable postop.   - Kidney function labs within normal range. Failed voiding trial yesterday. Padgett replaced. Will dc with padgett and patient instructed to follow-up with urology in 1 week. Patient will need to arrange follow-up outside the [facility] due to insurance. V/u and referral given. Also given prescription for Flomax.  - TRACEY drain output 0cc overnight, 25 cc in drain today. Removed without complication.   - Pain controlled with current regimen.  - Leave incision open to air. Bacitracin BID.  - LSO brace when up/OOB.  - Activity as tolerated. Continue PT/OT/OOB. OOB > 6hrs/day  - GI: Diet as tolerated. Continue bowel regimen. BM yesterday, + flatus.  - DVT ppx: SQH, Compression stockings, SCDs  - Atelectasis ppx: IS at bedside. Encourage use every hour while awake.  - Call with questions or concerns.   - PT/OT rec outpatient therapy. Referral given. Discharge today.                Betty Villalobos PA-C  Neurosurgery  [facility]

## 2019-05-22 NOTE — SUBJECTIVE & OBJECTIVE
Interval History: Continued hypotension, asymptomatic. Drain documented 0 overnight, 25cc in drain at 11am. Drain removed today. Continued saddle anesthesia. Pain controlled. Failed voiding trial yesterday and padgett replaced. Will discharge with padgett, instructed to follow-up with Urology within 1 week for voiding trial. Patient given referral and will have to find Urologist as [facility] does not take her insurance. Referral given for OP PT/OT. Will discharge today with pain medication, Flomax, and appropriate referrals.     Medications:  Continuous Infusions:  Scheduled Meds:   bacitracin   Topical (Top) BID    bisacodyl  10 mg Rectal Daily    calcium carbonate  1,000 mg Oral BID    ceFAZolin (ANCEF) IVPB  2 g Intravenous Q8H    heparin (porcine)  5,000 Units Subcutaneous Q8H    mupirocin  1 g Nasal BID    pantoprazole  40 mg Oral Daily    tamsulosin  0.4 mg Oral Daily     PRN Meds:acetaminophen, aluminum-magnesium hydroxide-simethicone, dextrose 50%, dextrose 50%, diazePAM, glucagon (human recombinant), glucose, glucose, glucose, HYDROcodone-acetaminophen, insulin aspart U-100, morphine, ondansetron, promethazine (PHENERGAN) IVPB, senna-docusate 8.6-50 mg, sodium chloride 0.9%       Objective:     Weight: 92.2 kg (203 lb 4.2 oz)  Body mass index is 33.82 kg/m².  Vital Signs (Most Recent):  Temp: 98 °F (36.7 °C) (05/22/19 0400)  Pulse: 74 (05/22/19 0400)  Resp: 18 (05/22/19 0400)  BP: (!) 107/53 (05/22/19 0400)  SpO2: 98 % (05/22/19 0400) Vital Signs (24h Range):  Temp:  [97 °F (36.1 °C)-98 °F (36.7 °C)] 98 °F (36.7 °C)  Pulse:  [74-96] 74  Resp:  [17-18] 18  SpO2:  [96 %-100 %] 98 %  BP: (101-110)/(53-56) 107/53                          Closed/Suction Drain 05/19/19 2304 Right Back Bulb 15 Fr. (Active)   Site Description Unable to view 5/21/2019  8:00 PM   Dressing Type Transparent 5/21/2019  8:00 PM   Dressing Status Clean;Dry;Intact 5/21/2019  8:00 PM   Drainage Bloody 5/21/2019  8:00 PM   Status  "To bulb suction 5/20/2019  9:00 AM   Output (mL) 0 mL 5/22/2019  5:38 AM            Urethral Catheter 05/21/19 2300 Straight-tip (Active)   Site Assessment Clean;Intact 5/21/2019 11:00 PM   Collection Container Urimeter 5/21/2019 11:00 PM   Securement Method secured to top of thigh w/ adhesive device 5/21/2019 11:00 PM   Catheter Care Performed yes 5/21/2019 11:00 PM   Reason for Continuing Urinary Catheterization Urinary retention 5/21/2019 11:00 PM   CAUTI Prevention Bundle StatLock in place w 1" slack;Intact seal between catheter & drainage tubing;Drainage bag off the floor;Green sheeting clip in use;No dependent loops or kinks;Drainage bag below bladder;Drainage bag not overfilled (<2/3 full) 5/21/2019 11:00 PM   Output (mL) 350 mL 5/22/2019  5:38 AM       Neurosurgery Physical Exam    General: well developed, well nourished, no distress.   Head: normocephalic, atraumatic  Neurologic: Alert and oriented. Thought content appropriate.  GCS: Motor: 6/Verbal: 5/Eyes: 4 GCS Total: 15  Mental Status: Awake, Alert, Oriented x 4  Language: No aphasia  Speech: No dysarthria  Cranial nerves: face symmetric, tongue midline, CN II-XII grossly intact, EOMI.   Pulmonary: normal respirations, no signs of respiratory distress  Abdomen: soft, non-distended, not tender to palpation  Sensory: intact to light touch throughout  Motor Strength: Moves all extremities spontaneously with good tone.  Full strength upper and lower extremities. No abnormal movements seen.   Jose: absent  Clonus: absent  Babinski: absent  Pulses: 2+ and symmetric radial and dorsalis pedis.  Skin: Skin is warm, dry and intact.  TRACEY drain in place.   Nash in place.   Incision c/d/i with staples approx skin edges. No surrounding erythema or edema. No drainage from incision.     Significant Labs:  Recent Labs   Lab 05/21/19  0432 05/22/19  0331   GLU 76 78    137   K 4.3 4.3    103   CO2 24 29   BUN 15 14   CREATININE 0.9 0.8   CALCIUM 8.1* 7.9* "     Recent Labs   Lab 05/21/19  0432 05/22/19  0331   WBC 6.34 6.35   HGB 11.2* 10.9*   HCT 33.6* 33.1*    191     No results for input(s): LABPT, INR, APTT in the last 48 hours.  Microbiology Results (last 7 days)     ** No results found for the last 168 hours. **        Recent Lab Results       05/22/19  0331        Anion Gap 5     Baso # 0.02     Basophil% 0.3     BUN, Bld 14     Calcium 7.9     Chloride 103     CO2 29     Creatinine 0.8     Differential Method Automated     eGFR if African American >60.0     eGFR if non  >60.0  Comment:  Calculation used to obtain the estimated glomerular filtration  rate (eGFR) is the CKD-EPI equation.        Eos # 0.4     Eosinophil% 6.3     Glucose 78     Gran # (ANC) 2.2     Gran% 34.2     Hematocrit 33.1     Hemoglobin 10.9     Immature Grans (Abs) 0.01  Comment:  Mild elevation in immature granulocytes is non specific and   can be seen in a variety of conditions including stress response,   acute inflammation, trauma and pregnancy. Correlation with other   laboratory and clinical findings is essential.       Immature Granulocytes 0.2     Lymph # 3.0     Lymph% 47.2     MCH 29.5     MCHC 32.9     MCV 90     Mono # 0.8     Mono% 11.8     MPV 11.5     nRBC 0     Platelets 191     Potassium 4.3     RBC 3.69     RDW 15.1     Sodium 137     WBC 6.35         All pertinent labs from the last 24 hours have been reviewed.    Significant Diagnostics:  No new imaging to review.

## 2019-05-22 NOTE — ASSESSMENT & PLAN NOTE
39F w/ PMH smoking but no other contributory medical conditions who presents from OSH w/ cauda equina syndrome from L5/S1 HNP. MRI w/ L5/S1 HNP. Now s/p MIS L5/S1 laminectomy/discectomy on 5/19.    - Doing well postoperatively. Neurologically stable  - Hypotensive overnight and throughout today. Asymptomatic. H/H stable postop.   - Kidney function labs within normal range, will ctm. Remove padgett today, will monitor for urinary retention. Bladder scan prn.   - TRACEY drain output 80cc overnight, continue. Continue antibiotic ppx while drain in place.   - Pain controlled with current regimen.  - Leave incision open to air. Bacitracin BID.  - LSO brace when up/OOB.  - Activity as tolerated. Continue PT/OT/OOB. OOB > 6hrs/day  - GI: Diet as tolerated. Continue bowel regimen.   - DVT ppx: SQH, Compression stockings, SCDs  - Atelectasis ppx: IS at bedside. Encourage use every hour while awake.  - Call with questions or concerns.   - PT/OT rec outpatient therapy. Discharge pending drain removal.         
39F w/ PMH smoking but no other contributory medical conditions who presents from OSH w/ cauda equina syndrome from L5/S1 HNP. MRI w/ L5/S1 HNP. Now s/p MIS L5/S1 laminectomy/discectomy on 5/19.    - Doing well postoperatively. Neurologically stable  - Hypotensive overnight and throughout today. Asymptomatic. H/H stable postop.   - Kidney function labs within normal range. Failed voiding trial yesterday. Padgett replaced. Will dc with padgett and patient instructed to follow-up with urology in 1 week. Patient will need to arrange follow-up outside the [facility] due to insurance. V/u and referral given. Also given prescription for Flomax.  - TRACEY drain output 0cc overnight, 25 cc in drain today. Removed without complication.   - Pain controlled with current regimen.  - Leave incision open to air. Bacitracin BID.  - LSO brace when up/OOB.  - Activity as tolerated. Continue PT/OT/OOB. OOB > 6hrs/day  - GI: Diet as tolerated. Continue bowel regimen.   - DVT ppx: SQH, Compression stockings, SCDs  - Atelectasis ppx: IS at bedside. Encourage use every hour while awake.  - Call with questions or concerns.   - PT/OT rec outpatient therapy. Referral given. Discharge today.          
39F w/ PMH smoking but no other contributory medical conditions who presents from OSH w/ cauda equina syndrome from L5/S1 HNP. MRI w/ L5/S1 HNP. Now s/p MIS L5/S1 laminectomy/discectomy on 5/19.    - Doing well postoperatively. Neurologically stable  - Hypotensive overnight and throughout today. Asymptomatic. H/H stable postop.   - Kidney function labs within normal range. Failed voiding trial yesterday. Padgett replaced. Will dc with padgett and patient instructed to follow-up with urology in 1 week. Patient will need to arrange follow-up outside the [facility] due to insurance. V/u and referral given. Also given prescription for Flomax.  - TRACEY drain output 0cc overnight, 25 cc in drain today. Removed without complication.   - Pain controlled with current regimen.  - Leave incision open to air. Bacitracin BID.  - LSO brace when up/OOB.  - Activity as tolerated. Continue PT/OT/OOB. OOB > 6hrs/day  - GI: Diet as tolerated. Continue bowel regimen.   - DVT ppx: SQH, Compression stockings, SCDs  - Atelectasis ppx: IS at bedside. Encourage use every hour while awake.  - Call with questions or concerns.   - PT/OT rec outpatient therapy. Referral given. Discharge today.          
39F w/ PMH smoking but no other contributory medical conditions who presents from OSH w/ cauda equina syndrome from L5/S1 HNP. MRI w/ L5/S1 HNP. Now s/p MIS L5/S1 laminectomy/discectomy on 5/19.    - Doing well postoperatively. Neurologically stable  - Kidney function labs stabilizing. Nash in place. Will attempt voiding trial tomorrow. Will continue to monitor.  - TRACEY drain output 65cc overnight, continue. Continue antibiotic ppx while drain in place.   - Pain controlled with current regimen.  - Continue dressing to incision.  - LSO brace when up/OOB.  - Activity as tolerated. Continue PT/OT/OOB. OOB > 6hrs/day  - GI: Diet as tolerated. Continue bowel regimen.   - DVT ppx: SQH, Compression stockings, SCDs  - Atelectasis ppx: IS at bedside. Encourage use every hour while awake.          
39F w/ PMH smoking but no other contributory medical conditions who presents from OSH w/ cauda equina syndrome from L5/S1 HNP:    --Evaluated at bedside in ED  --All labs and diagnostics reviewed  --MRI w/ L5/S1 HNP  --Clinical picture of cauda equina syndrome confirmed  --To OR for emergent decompression  --Further reccs to follow surgery  
no

## 2019-05-22 NOTE — DISCHARGE INSTRUCTIONS
Please follow ONLY the instructions that are checked below.    Activity Restrictions:  [x]  Return to work will be determined on an individual basis.  [x]  No lifting greater than 10 pounds.  [x]  Avoid bending and twisting the area of your surgery more than 45 degrees from neutral position in any direction.  [x]  No driving or operating machinery:  [x]  until cleared by your surgeon.  [x]  while taking narcotic pain medications or muscle relaxants.  [x]  No cervical collar, soft collar, or lumbar brace required.  []  Wear your brace at all times. You may be given an extra brace or soft collar to wear when showering.  []  Wear your brace at all times except when flat in bed.  []  Wear brace for comfort only.  [x]  Increase ambulation over the next 2 weeks so that you are walking 2 miles per day at 2 weeks post-operatively.  [x]  Walk on paved surfaces only. It is okay to walk up and down stairs while holding onto a side rail.  [x]  No sexual activity for 2-3 weeks.    Discharge Medication/Follow-up:  [x]  Please refer to discharge medication reconciliation form.  [x]  Do not take ANY non-steroidal anti-inflammatory drugs (NSAIDS), including the following: ibuprofen, naprosyn, Aleve, Advil, Indocin, Mobic, or Celebrex for:  [x]  2 weeks  []  8 weeks  []  6 months  [x]  Prescriptions for appropriate medication will be given upon discharge.   [x]  Pain control:  Hydrocodone           [x]  Muscle relaxer:  Valium                      [x]  Medication for urinary retention:  Flomax (given enough for medication until able to see Urologist)   [x]  Take docusate (Colace 100 mg): take one capsule a day as needed for constipation. You can get this over the counter.  [x]  Follow-up appointment:  [x]  10-14 days post-op for wound check by physician assistant/nurse  [x]  4-6 weeks with MD:  []  with x-rays  []  without x-rays  [x]  An appointment will be mailed to you.    Wound Care:  []  Remove dressing or bandaid in     days.  [x]  No bandage required. Keep your incision open to the air.  [x]  You may shower on the 2nd day after your surgery. Have the force of water hit you opposite from the incision. Pat the incision dry after your shower; do not scrub the incision.  [x]  You cannot take a bath until 8 weeks after surgery.  [x]  Apply bacitracin to incision twice a day.    Call your doctor or go to the Emergency Room for any signs of infection, including: increased redness, drainage, pain, or fever (temperature ?101.5 for 24 hours). Call your doctor or go to the Emergency Room if there are any localized neurological changes; problems with speech, vision, numbness, tingling, weakness, or severe headache; or for other concerns.    Special Instructions:  [x]  No use of tobacco products.  [x]  Diet: Please eat a regular diet as tolerated.  []  Other diet:              Specific physician instructions:  Do not take any other muscle relaxers while taking Valium. You will need to follow-up with a Urologist in 1 week for a voiding trial for urinary retention. You will be given a referral for this to take to a Urologist that accepts your insurance. You will need to follow-up with your PCP within 1 week for hypocalcemia. You can take over the counter Calcium supplements as directed on the bottle in the meantime.          Physicians need 3 days' notice for pain medicine to be refilled. Pain medicine will only be refilled between 8 AM and 5 PM, Monday through Friday, due to Food and Drug Administration regulation of documentation.    If you have any questions about this form, please call [phone].    Form No. 86394 (Revised 10/31/2013)

## 2019-05-22 NOTE — DISCHARGE SUMMARY
[facility]  Neurosurgery  Discharge Summary      Patient Name: Leti Babb  MRN: [id number]  Admission Date: 5/19/2019  Hospital Length of Stay: 3 days  Discharge Date and Time:  05/22/2019 11:40 AM  Attending Physician: Brandon Hardy MD   Discharging Provider: Betty Villalobos PA-C  Primary Care Provider: Vito Figueredo MD    HPI:   39F w/ PMH smoking but no other contributory medical conditions who presents from OSH w/ cauda equina syndrome from L5/S1 HNP. Patient states that she has had LBP radiating into her BLE to the knee since December. Last week she fell on her back in the bathtub and her pain has been progressively worse since. Yesterday she realized that she was having difficulty with urination, abdominal pain and was unable to control her voiding. She presented to OSH where she was evaluated and a padgett was placed w/ 1500mL of urine returned. She was transferred to [facility] ED where MRI shows L5/S1 HNP. Additionally, she describes saddle anesthesia. ROS is otherwise negative.    Procedure(s) (LRB):  LAMINECTOMY, SPINE, LUMBAR, WITH FUSION (L5/S1) MIS laminectomy Microscope, metrix set (do not open) Class A (N/A)     Hospital Course: 5/19: Patient seen at bedside, PRECIOUS diagnosed on H&P and imaging, taken for emergent surgery  5/20: NAEON. Reports incisional back pain. Continued saddle anesthesia. LSO brace in place. Padgett in place. Denies worsening weakness, numbness, paresthesias. Up to chair this morning, tolerated well. Drain output 65cc overnight.   5/21: Hypotensive overnight, asymptomatic. Will continue to monitor. Pain controlled with current regimen. Continued saddle anesthesia. Padgett in place, voiding trial today. Drain output 155cc in past 24 hrs, 80 overnight. Kidney function labs within normal range today, will ctm. PT/OT rec outpatient therapy. Discharge pending drain removal.   5/22: Continued hypotension, asymptomatic. Drain documented 0 overnight, 25cc in drain at  11am. Drain removed today. Continued saddle anesthesia. Pain controlled. Failed voiding trial yesterday and padgett replaced. Will discharge with padgett, instructed to follow-up with Urology within 1 week for voiding trial. Patient given referral and will have to find Urologist as [facility] does not take her insurance. Referral given for OP PT/OT. Will discharge today with pain medication, Flomax, and appropriate referrals.     Consults:   Consults (From admission, onward)        Status Ordering Provider     Inpatient consult to Neurosurgery  Once     Provider:  (Not yet assigned)    Acknowledged ROBBIN PECK          Significant Diagnostic Studies: Labs:   BMP:   Recent Labs   Lab 05/21/19  0432 05/22/19  0331   GLU 76 78    137   K 4.3 4.3    103   CO2 24 29   BUN 15 14   CREATININE 0.9 0.8   CALCIUM 8.1* 7.9*    and CBC   Recent Labs   Lab 05/21/19  0432 05/22/19  0331   WBC 6.34 6.35   HGB 11.2* 10.9*   HCT 33.6* 33.1*    191     Radiology: MRI Lumbar spine    Pending Diagnostic Studies:     None        Final Active Diagnoses:    Diagnosis Date Noted POA    PRINCIPAL PROBLEM:  Cauda equina syndrome [G83.4] 05/19/2019 Yes    Urinary retention [R33.9]  Unknown    Kidney function test abnormal [R94.4]  Unknown    Hypotension [I95.9]  Unknown      Problems Resolved During this Admission:      Discharged Condition: good    Disposition: Home or Self Care    Follow Up: 2 weeks for wound check in neurosurgery clinic    Patient Instructions:      Ambulatory Referral to Physical/Occupational Therapy   Referral Priority: Routine Referral Type: Physical Medicine   Referral Reason: Specialty Services Required   Requested Specialty: Physical Therapy   Number of Visits Requested: 1     Ambulatory Referral to Urology   Referral Priority: Urgent Referral Type: Consultation   Referral Reason: Specialty Services Required   Requested Specialty: Urology   Number of Visits Requested: 1     Notify your  health care provider if you experience any of the following:  temperature >100.4     Notify your health care provider if you experience any of the following:  persistent nausea and vomiting or diarrhea     Notify your health care provider if you experience any of the following:  severe uncontrolled pain     Notify your health care provider if you experience any of the following:  redness, tenderness, or signs of infection (pain, swelling, redness, odor or green/yellow discharge around incision site)     Notify your health care provider if you experience any of the following:  difficulty breathing or increased cough     Notify your health care provider if you experience any of the following:  severe persistent headache     Notify your health care provider if you experience any of the following:  worsening rash     Notify your health care provider if you experience any of the following:  persistent dizziness, light-headedness, or visual disturbances     Notify your health care provider if you experience any of the following:  increased confusion or weakness     Activity as tolerated     Medications:  Reconciled Home Medications:      Medication List      START taking these medications    bacitracin 500 unit/gram Oint  Apply topically 2 (two) times daily.     calcium carbonate 500 mg calcium (1,250 mg) tablet  Commonly known as:  OS-JENISE  Take 2 tablets (1,000 mg total) by mouth 2 (two) times daily. for 7 days     diazePAM 5 MG tablet  Commonly known as:  VALIUM  Take 1 tablet (5 mg total) by mouth every 6 (six) hours as needed (spasms).     HYDROcodone-acetaminophen  mg per tablet  Commonly known as:  NORCO  Take 1 tablet by mouth every 6 (six) hours as needed for Pain.  Replaces:  HYDROcodone-acetaminophen 7.5-325 mg per tablet     tamsulosin 0.4 mg Cap  Commonly known as:  FLOMAX  Take 1 capsule (0.4 mg total) by mouth once daily. for 21 days  Start taking on:  5/23/2019        STOP taking these medications     HYDROcodone-acetaminophen 7.5-325 mg per tablet  Commonly known as:  NORCO  Replaced by:  HYDROcodone-acetaminophen  mg per tablet     methylPREDNISolone 4 mg tablet  Commonly known as:  MEDROL DOSEPACK  Take 4 mg by mouth. use as directed     ASK your doctor about these medications    cyclobenzaprine 10 MG tablet  Commonly known as:  FLEXERIL  Take 10 mg by mouth 3 (three) times daily as needed for Muscle spasms.  Do not take while taking Valium.   meloxicam 15 MG tablet  Commonly known as:  MOBIC  Take 15 mg by mouth once daily.  Ok to resume in 2 weeks.      naproxen 500 MG tablet  Commonly known as:  NAPROSYN  Take 500 mg by mouth 2 (two) times daily.  Ok to resume in 2 weeks.           Betty Villalobos PA-C  Neurosurgery  [facility]

## 2019-05-22 NOTE — PLAN OF CARE
Patient is s/p MIS L5/S1 laminectomy/discectomy on 5/19.  CM is covering discharge needs for the primary CM, Deliajordan.  Patient was discharged home before CM was able to see her.  CM was able to speak to the patient over the phone regarding follow up.  Patient was d/c with a padgett due to urinary retention .  Patient does not have a PCP as listed in chart.  The patient did not have a full understanding that she will need a voiding trial in week.  ANNE was able to locate 1 urologist near the patient's home that accepts medicaid.  ANNE placed a ambulatory referral to Dr Liu Grace as requested by Marcia @ [facility].  CM faxed the referral to [phone].  Marcia said that they will likely not get reimbursed for a voiding trial appointment but she will speak to Dr Grace regarding appointment.  CM provided her contact # to call with appointment info.  Orders for OP PT/OT placed.  ANNE sent an inbasket message to neuro sx clinic to schedule a 2 week f/u for wound check.  No other needs anticipated.  CM will update the patient regarding urology appt.      Follow-up Information     Brandon Hardy MD In 2 weeks.    Specialty:  Neurosurgery  Why:  follow up  Contact information:  [address]  [facility] [address]  [phone]             Follow up In 1 week.    Why:  voiding trial  Contact information:  Urology            Liu Grace MD .    Specialty:  Urology  Contact information:  [address]  [facility] [address]  [phone]                    05/22/19 1417   Final Note   Assessment Type Final Discharge Note   Anticipated Discharge Disposition Home  (OP PT)   What phone number can be called within the next 1-3 days to see how you are doing after discharge?   ([phone])   Hospital Follow Up  Appt(s) scheduled?   (appointment requested via inbasket)

## 2019-05-22 NOTE — NURSING
pt bladder scan showed 700cc with no urge to void, NS notified, instructed to insert padgett catheter due to retention. pt tolerated well, will continue to monitor.

## 2019-05-23 NOTE — PT/OT/SLP DISCHARGE
Occupational Therapy Discharge Summary    Leti Babb  MRN: [id number]   Principal Problem: Cauda equina syndrome      Patient Discharged from acute Occupational Therapy on 5/23/2019.  Please refer to prior OT note dated 5/20/2019 for functional status.    Assessment:      Patient appropriate for care in another setting.    Objective:     GOALS:   Multidisciplinary Problems     Occupational Therapy Goals        Problem: Occupational Therapy Goal    Goal Priority Disciplines Outcome Interventions   Occupational Therapy Goal     OT, PT/OT Ongoing (interventions implemented as appropriate)    Description:  Goals to be met by: 5/27/2019    Patient will increase functional independence with ADLs by performing:    UE Dressing to don shirt and LSO brace with Supervision.  LE Dressing to don pants with Supervision with AD as needed.  Grooming while standing at sink with Supervision.  Toileting from toilet with Supervision for hygiene and clothing management.   Stand pivot transfers with Supervision.  Toilet transfer to toilet with Supervision.                         Reasons for Discontinuation of Therapy Services  Transfer to alternate level of care.      Plan:     Patient Discharged to: Outpatient Therapy Services    Sherrie Wheeler, OT  5/23/2019

## 2019-05-23 NOTE — PT/OT/SLP DISCHARGE
Physical Therapy Discharge Summary    Name: Leti Babb  MRN: [id number]   Principal Problem: Cauda equina syndrome     Patient Discharged from acute Physical Therapy on .  Please refer to prior PT noted date on 19 for functional status.     Assessment:     Goals partially met. Patient appropriate for care in another setting.    Objective:     GOALS:   Multidisciplinary Problems     Physical Therapy Goals        Problem: Physical Therapy Goal    Goal Priority Disciplines Outcome Goal Variances Interventions   Physical Therapy Goal     PT, PT/OT Ongoing (interventions implemented as appropriate)     Description:  Goals to be met by: 6/3/19    Patient will increase functional independence with mobility by performin. Supine to sit with supervision  2. Sit to stand transfer with supervision  3. Gait x200 feet with supervision using DME as needed  4. Ascend/descend 4 stairs with bilateral Handrails with supervision                      Reasons for Discontinuation of Therapy Services  Transfer to alternate level of care.      Plan:     Patient Discharged to: Outpatient Therapy Services.    Nelly Granados, PT  2019

## 2019-06-03 ENCOUNTER — OFFICE VISIT (OUTPATIENT)
Dept: NEUROSURGERY | Facility: CLINIC | Age: 39
End: 2019-06-03
Payer: MEDICAID

## 2019-06-03 VITALS
HEART RATE: 95 BPM | WEIGHT: 225 LBS | SYSTOLIC BLOOD PRESSURE: 124 MMHG | BODY MASS INDEX: 37.49 KG/M2 | TEMPERATURE: 98 F | DIASTOLIC BLOOD PRESSURE: 78 MMHG | HEIGHT: 65 IN

## 2019-06-03 DIAGNOSIS — Z98.890 S/P LAMINECTOMY: ICD-10-CM

## 2019-06-03 DIAGNOSIS — G83.4 CAUDA EQUINA SYNDROME: ICD-10-CM

## 2019-06-03 DIAGNOSIS — Z98.890 S/P LUMBAR MICRODISCECTOMY: Primary | ICD-10-CM

## 2019-06-03 PROCEDURE — 99999 PR PBB SHADOW E&M-EST. PATIENT-LVL III: ICD-10-PCS | Mod: PBBFAC,,, | Performed by: NEUROLOGICAL SURGERY

## 2019-06-03 PROCEDURE — 99024 POSTOP FOLLOW-UP VISIT: CPT | Mod: ,,, | Performed by: NEUROLOGICAL SURGERY

## 2019-06-03 PROCEDURE — 99213 OFFICE O/P EST LOW 20 MIN: CPT | Mod: PBBFAC | Performed by: NEUROLOGICAL SURGERY

## 2019-06-03 PROCEDURE — 99024 PR POST-OP FOLLOW-UP VISIT: ICD-10-PCS | Mod: ,,, | Performed by: NEUROLOGICAL SURGERY

## 2019-06-03 PROCEDURE — 99999 PR PBB SHADOW E&M-EST. PATIENT-LVL III: CPT | Mod: PBBFAC,,, | Performed by: NEUROLOGICAL SURGERY

## 2019-06-03 RX ORDER — CIPROFLOXACIN 750 MG/1
750 TABLET, FILM COATED ORAL EVERY 12 HOURS
Qty: 14 TABLET | Refills: 0 | Status: SHIPPED | OUTPATIENT
Start: 2019-06-03 | End: 2019-06-10

## 2019-06-03 NOTE — PROGRESS NOTES
History & Physical    I, Volodymyr Rios, attest that this documentation has been prepared under the direction and in the presence of Brandon Hardy MD.    06/03/2019    Chief Complaint   Patient presents with    Post-op Evaluation       History of Present Illness:  Leti Babb is a 39 y.o. patient s/p L5-S1 laminectomies on 5/19/2019 for cauda equina syndrome. Patient presents for 2-week postoperative follow up evaluation.     Patient has since had a visit to the ER in [place] and was found to have a UTI. She had her Nash removed and replaced. Patient reports still some difficulty with control over bowel movements and urination. Patient states that she has been homeless for some time and states that she has been sleeping outside after losing her job. She has at this point already found a new place of living. Patient has not been to Rehab or Physical therapy.     Review of patient's allergies indicates:  No Known Allergies    Current Outpatient Medications   Medication Sig Dispense Refill    bacitracin 500 unit/gram Oint Apply topically 2 (two) times daily.  0    cyclobenzaprine (FLEXERIL) 10 MG tablet Take 10 mg by mouth 3 (three) times daily as needed for Muscle spasms.      diazePAM (VALIUM) 5 MG tablet Take 1 tablet (5 mg total) by mouth every 6 (six) hours as needed (spasms). 40 tablet 0    meloxicam (MOBIC) 15 MG tablet Take 15 mg by mouth once daily.      naproxen (NAPROSYN) 500 MG tablet Take 500 mg by mouth 2 (two) times daily.      tamsulosin (FLOMAX) 0.4 mg Cap Take 1 capsule (0.4 mg total) by mouth once daily. for 21 days 21 capsule 0    calcium carbonate (OS-JENISE) 500 mg calcium (1,250 mg) tablet Take 2 tablets (1,000 mg total) by mouth 2 (two) times daily. for 7 days  0    ciprofloxacin HCl (CIPRO) 750 MG tablet Take 1 tablet (750 mg total) by mouth every 12 (twelve) hours. for 7 days 14 tablet 0    HYDROcodone-acetaminophen (NORCO)  mg per tablet Take 1 tablet by mouth every 6  "(six) hours as needed for Pain. 50 tablet 0    methylPREDNISolone (MEDROL DOSEPACK) 4 mg tablet Take 4 mg by mouth. use as directed       No current facility-administered medications for this visit.        History reviewed. No pertinent past medical history.    Past Surgical History:   Procedure Laterality Date    LAMINECTOMY, SPINE, LUMBAR, WITH FUSION (L5/S1) MIS laminectomy Microscope, metrix set (do not open) Class A N/A 5/19/2019    Performed by Brandon Hardy MD at [facility] OR [address] [place] [address]       History reviewed. No pertinent family history.    Social History     Tobacco Use    Smoking status: Current Every Day Smoker     Packs/day: 0.50     Types: Cigarettes   Substance Use Topics    Alcohol use: Not Currently    Drug use: Not on file        Review of Systems:  Review of Systems   Constitutional: Negative for activity change.   HENT: Negative for congestion.    Eyes: Negative for discharge.   Respiratory: Negative for apnea.    Cardiovascular: Negative for chest pain.   Gastrointestinal: Negative for abdominal distention.        Positive for difficulty having bowel weakness.    Endocrine: Negative for cold intolerance.   Genitourinary: Positive for difficulty urinating.   Musculoskeletal: Positive for back pain. Negative for arthralgias.   Neurological: Negative for dizziness, weakness and headaches.   Psychiatric/Behavioral: Negative for agitation.       Vital Signs (Most Recent)  Temp: 97.7 °F (36.5 °C) (06/03/19 0854)  Pulse: 95 (06/03/19 0854)  BP: 124/78 (06/03/19 0854)  5' 5" (1.651 m)  102.1 kg (225 lb)       Physical Exam:  Neurosurgery Physical Exam    Strength is 5/5   Negative SLR bilaterally.   Absent rectal tone, but preserved saddle sensation (except perianal). Otherwise 5/5 BL LE sensation.  Wound is healed, no signs of infection. Staples removed.     Laboratory  CBC: Reviewed  CMP: Reviewed    Diagnostic Results:  MRI: Reviewed  I have personally reviewed the images and discussed them with " the patient:     MRI Lumbar Spine Without Contrast, dated 5/19/2019:   1. Large disc protrusion at L5-S1 resulting in severe spinal canal stenosis.  Correlation is advised noting there is limited evaluation secondary to motion artifact.  2. Additional spinal degenerative changes as described above.      ASSESSMENT/PLAN:       ICD-10-CM ICD-9-CM   1. S/P lumbar microdiscectomy Z98.890 V45.89   2. S/P laminectomy Z98.890 V45.89   3. Cauda equina syndrome G83.4 344.60       PLAN:    1. S/p L5-S1 microdiskectomy for cauda equina syndrome on 5/19/19. Patient has since then had a UTI and went to the ER in [place] where she had her Nash removed and put back in. I will place her on Cipro for several days and keep her on Flomax until she is seen by Urology.     2. I will refer her to Urology at [facility] so that she can get educated on self-caths, VS catheter removal.     3. I will advise the patient to also get weekly enemas. As a way to continue her bowel care, since she still has no rectal tone    4. Patient will follow up the 2nd week of July with a MRI of the lumbar spine with Dr. Howard (since I'll be leaving [facility]), in order to make sure there are no additional neurosurgical needs (after she sees urology)    5. I will refer the patient to outpatient Physical Therapy for gait and ambulation for back muscle strengthening.     Leti was seen today for post-op evaluation.    Diagnoses and all orders for this visit:    S/P lumbar microdiscectomy    S/P laminectomy  -     MRI Lumbar Spine Without Contrast; Future  -     Ambulatory Referral to Physical/Occupational Therapy    Cauda equina syndrome    Other orders  -     ciprofloxacin HCl (CIPRO) 750 MG tablet; Take 1 tablet (750 mg total) by mouth every 12 (twelve) hours. for 7 days      I, Dr. Brandon Hardy, personally performed the services described in this documentation. All medical record entries made by the jeffibvalarie were at my direction and in my  presence.  I have reviewed the chart and agree that the record reflects my personal performance and is accurate and complete. Brandon Hardy MD.  11:35 AM 06/03/2019

## 2019-06-13 ENCOUNTER — TELEPHONE (OUTPATIENT)
Dept: NEUROSURGERY | Facility: CLINIC | Age: 39
End: 2019-06-13

## 2020-05-10 ENCOUNTER — HISTORICAL (OUTPATIENT)
Dept: ADMINISTRATIVE | Facility: HOSPITAL | Age: 40
End: 2020-05-10

## 2020-05-10 LAB
APPEARANCE, UA: CLEAR
BACTERIA SPEC CULT: NORMAL /HPF
BILIRUB UR QL STRIP: NEGATIVE MG/DL
BUDDING YEAST: NORMAL /HPF
CASTS, URINE MICROSCOPIC: 15 /LPF
COLOR UR: NORMAL
EPITHELIAL, URINE MICROSCOPIC: NEGATIVE /HPF
GLUCOSE (UA): NEGATIVE MG/DL
HGB UR QL STRIP: NEGATIVE ERY/UL
KETONES UR QL STRIP: 15 MG/DL
LEUKOCYTE ESTERASE UR QL STRIP: NEGATIVE LEU/UL
NITRITE UR QL STRIP: POSITIVE MG/DL
PH UR STRIP: 5 [PH] (ref 5–7.5)
PROT UR QL STRIP: NORMAL MG/DL
RBC #/AREA URNS HPF: NEGATIVE /HPF
SP GR UR STRIP: 1.03 (ref 1–1.03)
SPERM, URINE MICROSCOPIC: NORMAL /HPF
TYPE OF SPECIMEN  (UA): NORMAL
UNCLASSIFIED CRYSTALS, UA: NORMAL /HPF
UROBILINOGEN UR STRIP-ACNC: 1 EU/L
WBC #/AREA URNS HPF: NEGATIVE /HPF

## 2020-06-26 ENCOUNTER — HISTORICAL (OUTPATIENT)
Dept: ADMINISTRATIVE | Facility: HOSPITAL | Age: 40
End: 2020-06-26

## 2020-06-26 LAB
B-HCG UR QL: NEGATIVE
INTERNAL NEG CONTROL: NEGATIVE
INTERNAL POS CONTROL: POSITIVE

## 2020-06-28 LAB
URINE CULTURE, ROUTINE: NORMAL

## 2020-07-12 LAB
ALBUMIN SERPL BCP-MCNC: 3.1 G/DL (ref 3.5–5)
ALBUMIN/GLOB SERPL ELPH: 0.8 {RATIO} (ref 1.5–2.2)
ALP SERPL-CCNC: 51 U/L (ref 45–117)
ALT SERPL W P-5'-P-CCNC: 15 U/L (ref 13–56)
ANION GAP SERPL CALC-SCNC: 6.4 MEQ/L (ref 10–20)
APPEARANCE, UA: CLEAR
AST SERPL-CCNC: 12 U/L (ref 15–37)
B-HCG UR QL: NEGATIVE
BACTERIA SPEC CULT: NORMAL /HPF
BASOPHILS NFR BLD: 0.1 10 (ref 0–0.1)
BASOPHILS NFR BLD: 1.3 % (ref 0–1.5)
BILIRUB SERPL-MCNC: 0.23 MG/DL (ref 0.2–1)
BILIRUB UR QL STRIP: NEGATIVE MG/DL
BUDDING YEAST: NORMAL /HPF
BUN SERPL-MCNC: 10 MG/DL (ref 7–18)
CALCIUM SERPL-MCNC: 8.4 MG/DL (ref 8.5–10.1)
CASTS, URINE MICROSCOPIC: NEGATIVE /LPF
CHLORIDE SERPL-SCNC: 108 MMOL/L (ref 98–107)
CO2 SERPL-SCNC: 27 MMOL/L (ref 22–32)
COLOR UR: YELLOW
CREAT SERPL-MCNC: 1 MG/DL (ref 0.55–1.02)
EGFR: 80 ML/MIN/1.73M
EOSINOPHIL NFR BLD: 0.3 10 (ref 0–0.7)
EOSINOPHIL NFR BLD: 5.4 % (ref 0–7)
EPITHELIAL, URINE MICROSCOPIC: NEGATIVE /HPF
ERYTHROCYTE [DISTWIDTH] IN BLOOD BY AUTOMATED COUNT: 14.4 % (ref 11.5–14.5)
GLOBULIN: 4 G/DL (ref 2.3–3.5)
GLUCOSE (UA): NEGATIVE MG/DL
GLUCOSE SERPL-MCNC: 61 MG/DL (ref 70–99)
GRAN #: 1.73 10 (ref 2–7.5)
GRAN%: 0.2 %
GRAN%: 37.4 % (ref 50–80)
HCT VFR BLD AUTO: 38.2 % (ref 37.7–47.9)
HGB BLD-MCNC: 12.8 G/DL (ref 12.2–16.2)
HGB UR QL STRIP: NEGATIVE ERY/UL
IMMATURE GRANULOCYTES #: 0.01 10
INTERNAL NEG CONTROL: NEGATIVE
INTERNAL POS CONTROL: POSITIVE
KETONES UR QL STRIP: NEGATIVE MG/DL
LEUKOCYTE ESTERASE UR QL STRIP: NEGATIVE LEU/UL
LYMPH #: 2.1 10 (ref 1–3.5)
LYMPH%: 45.8 % (ref 12–50)
MCH RBC QN AUTO: 30.6 PG (ref 27–31)
MCHC RBC AUTO-ENTMCNC: 33.5 G% (ref 32–35)
MCV RBC AUTO: 91.4 FL (ref 80–97)
MONO #: 0.5 10 (ref 0–0.8)
MONO%: 9.9 % (ref 0–12)
NITRITE UR QL STRIP: POSITIVE MG/DL
OSMOC: 271 MOSM/KG (ref 275–295)
PH UR STRIP: 6.5 [PH] (ref 5–7.5)
PMV BLD AUTO: 10.4 FL (ref 7.4–10.4)
PMV BLD AUTO: 262 10 (ref 142–424)
POTASSIUM SERPL-SCNC: 4.4 MMOL/L (ref 3.5–5.1)
PROT SERPL-MCNC: 7.1 G/DL (ref 6.4–8.2)
PROT UR QL STRIP: NEGATIVE MG/DL
RBC # BLD AUTO: 4.18 M/UL (ref 4.04–5.48)
RBC #/AREA URNS HPF: NEGATIVE /HPF
SODIUM BLD-SCNC: 137 MMOL/L (ref 136–145)
SP GR UR STRIP: 1.02 (ref 1–1.03)
SPERM, URINE MICROSCOPIC: NORMAL /HPF
TYPE OF SPECIMEN  (UA): NORMAL
UNCLASSIFIED CRYSTALS, UA: NORMAL /HPF
UROBILINOGEN UR STRIP-ACNC: 1 EU/L
WBC # BLD AUTO: 4.6 10 (ref 4–10.2)
WBC #/AREA URNS HPF: NEGATIVE /HPF

## 2020-07-14 LAB
URINE CULTURE, ROUTINE: NORMAL

## 2020-07-30 ENCOUNTER — HOSPITAL ENCOUNTER (EMERGENCY)
Facility: HOSPITAL | Age: 40
Discharge: HOME | End: 2020-07-30
Attending: EMERGENCY MEDICINE
Payer: MEDICAID

## 2020-07-30 VITALS
HEART RATE: 86 BPM | DIASTOLIC BLOOD PRESSURE: 68 MMHG | BODY MASS INDEX: 31.82 KG/M2 | OXYGEN SATURATION: 100 % | HEIGHT: 65 IN | TEMPERATURE: 98 F | SYSTOLIC BLOOD PRESSURE: 121 MMHG | RESPIRATION RATE: 14 BRPM | WEIGHT: 191 LBS

## 2020-07-30 DIAGNOSIS — K64.4 EXTERNAL HEMORRHOIDS: Primary | ICD-10-CM

## 2020-07-30 PROCEDURE — 99283 EMERGENCY DEPT VISIT LOW MDM: CPT

## 2020-07-30 PROCEDURE — 25000003 PHARM REV CODE 250: Performed by: EMERGENCY MEDICINE

## 2020-07-30 RX ORDER — HYDROCORTISONE 1 %
CREAM (GRAM) TOPICAL
Qty: 30 G | Refills: 0 | Status: SHIPPED | OUTPATIENT
Start: 2020-07-30

## 2020-07-30 RX ORDER — HYDROCODONE BITARTRATE AND ACETAMINOPHEN 10; 325 MG/1; MG/1
1 TABLET ORAL
Status: COMPLETED | OUTPATIENT
Start: 2020-07-30 | End: 2020-07-30

## 2020-07-30 RX ADMIN — HYDROCODONE BITARTRATE AND ACETAMINOPHEN 1 TABLET: 10; 325 TABLET ORAL at 07:07

## 2020-07-30 NOTE — ED PROVIDER NOTES
"Encounter Date: 7/30/2020       History     Chief Complaint   Patient presents with    Hemorrhoids     Pt repots "I have hemorrhoids, and they are coming out." Pt reports pain x 1 month     THIS IS A 40-YEAR-OLD BLACK FEMALE WITH A HISTORY OF HEMORRHOIDS COMPLAINING OF RECTAL PAIN FOR OVER A MONTH.  HAS BEEN SEEN IN THE ER MULTIPLE TIMES FOR SAME.  UNABLE TO AFFORD CREAMS AND SUPPOSITORIES.  NO FEVER NO BLOODY STOOLS        Review of patient's allergies indicates:  No Known Allergies  Past Medical History:   Diagnosis Date    Chronic constipation     GERD (gastroesophageal reflux disease)     Hemorrhoids      Past Surgical History:   Procedure Laterality Date    LUMBAR LAMINECTOMY WITH FUSION N/A 5/19/2019    Procedure: LAMINECTOMY, SPINE, LUMBAR, WITH FUSION (L5/S1) MIS laminectomy Microscope, metrix set (do not open) Class A;  Surgeon: Brandon Hardy MD;  Location: [facility] OR [address] [place] [address];  Service: Neurosurgery;  Laterality: N/A;    OVARIAN CYST REMOVAL  10/2019     Family History   Problem Relation Age of Onset    No Known Problems Father     Blindness Mother     Diabetes Mother      Social History     Tobacco Use    Smoking status: Current Every Day Smoker     Packs/day: 0.50     Types: Cigarettes    Smokeless tobacco: Never Used   Substance Use Topics    Alcohol use: Not Currently    Drug use: Yes     Types: Marijuana, Methamphetamines     Review of Systems   Constitutional: Negative for fatigue.   Gastrointestinal: Positive for rectal pain.   All other systems reviewed and are negative.      Physical Exam     Initial Vitals [07/30/20 0655]   BP Pulse Resp Temp SpO2   121/68 86 18 98.3 °F (36.8 °C) 100 %      MAP       --         Physical Exam    Nursing note and vitals reviewed.  Constitutional: She appears well-developed and well-nourished.   HENT:   Head: Normocephalic and atraumatic.   Eyes: EOM are normal. Pupils are equal, round, and reactive to light.   Neck: Normal range of motion. Neck " supple.   Cardiovascular: Normal rate, regular rhythm, normal heart sounds and intact distal pulses.   Pulmonary/Chest: Breath sounds normal.   Abdominal: Soft. Bowel sounds are normal.   PATIENT WITH EXTERNAL HEMORRHOIDS THAT ARE TENDER TO TOUCH.  NO ABSCESS.  NURSE PRESENT FOR EXAM   Musculoskeletal: Normal range of motion.   Neurological: She is alert and oriented to person, place, and time.   Skin: Skin is warm and dry.         ED Course   Procedures  Labs Reviewed - No data to display       Imaging Results    None                                          Clinical Impression:       ICD-10-CM ICD-9-CM   1. External hemorrhoids  K64.4 455.3         Disposition:   Disposition: Discharged  Condition: Stable                        Vito Figueredo MD  07/30/20 0704

## 2020-10-01 ENCOUNTER — HOSPITAL ENCOUNTER (EMERGENCY)
Facility: HOSPITAL | Age: 40
Discharge: HOME | End: 2020-10-01
Attending: EMERGENCY MEDICINE
Payer: MEDICAID

## 2020-10-01 VITALS
WEIGHT: 188 LBS | HEIGHT: 65 IN | DIASTOLIC BLOOD PRESSURE: 75 MMHG | BODY MASS INDEX: 31.32 KG/M2 | TEMPERATURE: 98 F | RESPIRATION RATE: 18 BRPM | SYSTOLIC BLOOD PRESSURE: 110 MMHG | HEART RATE: 71 BPM | OXYGEN SATURATION: 100 %

## 2020-10-01 DIAGNOSIS — M54.31 RIGHT SIDED SCIATICA: Primary | ICD-10-CM

## 2020-10-01 DIAGNOSIS — K59.00 CONSTIPATION, UNSPECIFIED CONSTIPATION TYPE: ICD-10-CM

## 2020-10-01 PROCEDURE — 99284 EMERGENCY DEPT VISIT MOD MDM: CPT | Mod: 25

## 2020-10-01 PROCEDURE — 96372 THER/PROPH/DIAG INJ SC/IM: CPT

## 2020-10-01 PROCEDURE — 63600175 PHARM REV CODE 636 W HCPCS: Performed by: EMERGENCY MEDICINE

## 2020-10-01 PROCEDURE — 25000003 PHARM REV CODE 250: Performed by: EMERGENCY MEDICINE

## 2020-10-01 RX ORDER — KETOROLAC TROMETHAMINE 30 MG/ML
60 INJECTION, SOLUTION INTRAMUSCULAR; INTRAVENOUS
Status: COMPLETED | OUTPATIENT
Start: 2020-10-01 | End: 2020-10-01

## 2020-10-01 RX ORDER — DEXAMETHASONE SODIUM PHOSPHATE 4 MG/ML
8 INJECTION, SOLUTION INTRA-ARTICULAR; INTRALESIONAL; INTRAMUSCULAR; INTRAVENOUS; SOFT TISSUE
Status: COMPLETED | OUTPATIENT
Start: 2020-10-01 | End: 2020-10-01

## 2020-10-01 RX ORDER — LACTULOSE 10 G/15ML
20 SOLUTION ORAL 3 TIMES DAILY
Qty: 900 ML | Refills: 0 | Status: SHIPPED | OUTPATIENT
Start: 2020-10-01 | End: 2020-10-11

## 2020-10-01 RX ORDER — LACTULOSE 10 G/15ML
30 SOLUTION ORAL
Status: COMPLETED | OUTPATIENT
Start: 2020-10-01 | End: 2020-10-01

## 2020-10-01 RX ADMIN — KETOROLAC TROMETHAMINE 60 MG: 30 INJECTION, SOLUTION INTRAMUSCULAR; INTRAVENOUS at 07:10

## 2020-10-01 RX ADMIN — DEXAMETHASONE SODIUM PHOSPHATE 8 MG: 4 INJECTION, SOLUTION INTRA-ARTICULAR; INTRALESIONAL; INTRAMUSCULAR; INTRAVENOUS; SOFT TISSUE at 07:10

## 2020-10-01 RX ADMIN — LACTULOSE 30 G: 20 SOLUTION ORAL at 07:10

## 2020-10-01 NOTE — ED PROVIDER NOTES
Encounter Date: 10/1/2020       History     Chief Complaint   Patient presents with    Sciatica     Im having pain in my right leg.  Its Sciatic pain.     This is a 40-year-old black female with history of sciatica on the right side for years, complaining of right-sided leg pain that radiates from the buttocks down to foot.  Denies any trauma.  Also complaining of constipation.  Denies any nausea vomiting diarrhea, denies blood in stool.  Denies fever.  Pain is worse with movement, improves with rest        Review of patient's allergies indicates:  No Known Allergies  Past Medical History:   Diagnosis Date    Chronic constipation     GERD (gastroesophageal reflux disease)     Hemorrhoids      Past Surgical History:   Procedure Laterality Date    LUMBAR LAMINECTOMY WITH FUSION N/A 5/19/2019    Procedure: LAMINECTOMY, SPINE, LUMBAR, WITH FUSION (L5/S1) MIS laminectomy Microscope, metrix set (do not open) Class A;  Surgeon: Brandon Hardy MD;  Location: [facility] OR [address] [place] [address];  Service: Neurosurgery;  Laterality: N/A;    OVARIAN CYST REMOVAL  10/2019     Family History   Problem Relation Age of Onset    No Known Problems Father     Blindness Mother     Diabetes Mother      Social History     Tobacco Use    Smoking status: Current Every Day Smoker     Packs/day: 0.50     Types: Cigarettes    Smokeless tobacco: Never Used   Substance Use Topics    Alcohol use: Not Currently    Drug use: Yes     Types: Marijuana, Methamphetamines     Review of Systems   Constitutional: Negative for fever.   HENT: Negative for sore throat.    Respiratory: Negative for shortness of breath.    Cardiovascular: Negative for chest pain.   Gastrointestinal: Positive for constipation. Negative for nausea and vomiting.   Genitourinary: Negative for dysuria.   Musculoskeletal: Negative for back pain.        Pain in right leg   Skin: Negative for rash.   Neurological: Negative for weakness.   Hematological: Does not bruise/bleed  easily.   All other systems reviewed and are negative.      Physical Exam     Initial Vitals   BP Pulse Resp Temp SpO2   10/01/20 0724 10/01/20 0724 10/01/20 0723 10/01/20 0724 10/01/20 0724   110/75 71 18 98 °F (36.7 °C) 100 %      MAP       --                Physical Exam    Nursing note and vitals reviewed.  Constitutional: She appears well-developed and well-nourished. She is not diaphoretic. No distress.   HENT:   Head: Normocephalic and atraumatic.   Eyes: Conjunctivae and EOM are normal. Pupils are equal, round, and reactive to light.   Neck: Normal range of motion. Neck supple.   Cardiovascular: Normal rate, regular rhythm, normal heart sounds and intact distal pulses.   No murmur heard.  Pulmonary/Chest: Breath sounds normal. No respiratory distress. She has no wheezes. She has no rhonchi. She has no rales. She exhibits no tenderness.   Abdominal: Soft. Bowel sounds are normal.   Musculoskeletal: Normal range of motion. No tenderness or edema.      Comments: Neurovascularly intact throughout, strong pulses.  Pain in right leg with 45 degree movement.  Steady gait   Neurological: She is alert and oriented to person, place, and time. She has normal strength and normal reflexes. No cranial nerve deficit. GCS score is 15. GCS eye subscore is 4. GCS verbal subscore is 5. GCS motor subscore is 6.   Skin: Skin is warm and dry. Capillary refill takes less than 2 seconds.         ED Course   Procedures  Labs Reviewed - No data to display       Imaging Results    None          Medical Decision Making:   Differential Diagnosis:   Sciatica, constipation                             Clinical Impression:     ICD-10-CM ICD-9-CM   1. Right sided sciatica  M54.31 724.3   2. Constipation, unspecified constipation type  K59.00 564.00                          ED Disposition Condition    Discharge Stable        ED Prescriptions     Medication Sig Dispense Start Date End Date Auth. Provider    lactulose (CHRONULAC) 20 gram/30 mL  Soln Take 30 mLs (20 g total) by mouth 3 (three) times daily. for 10 days 900 mL 10/1/2020 10/11/2020 Vito Figueredo MD        Follow-up Information     Follow up With Specialties Details Why Contact Info Additional Information    Primary care physician  In 2 days       [facility] Emergency Department Emergency Medicine  If symptoms worsen [address] [place] [id number]  [phone] Floor 1                                       Vito Figueredo MD  10/01/20 0728

## 2020-12-31 ENCOUNTER — HOSPITAL ENCOUNTER (EMERGENCY)
Facility: HOSPITAL | Age: 40
Discharge: HOME | End: 2021-01-01
Attending: EMERGENCY MEDICINE
Payer: MEDICAID

## 2020-12-31 DIAGNOSIS — T21.25XA SECOND DEGREE BURN OF BUTTOCK, INITIAL ENCOUNTER: Primary | ICD-10-CM

## 2020-12-31 PROCEDURE — 99283 EMERGENCY DEPT VISIT LOW MDM: CPT

## 2021-01-01 VITALS
OXYGEN SATURATION: 100 % | RESPIRATION RATE: 18 BRPM | SYSTOLIC BLOOD PRESSURE: 120 MMHG | WEIGHT: 191 LBS | TEMPERATURE: 98 F | HEART RATE: 88 BPM | DIASTOLIC BLOOD PRESSURE: 58 MMHG | BODY MASS INDEX: 30.7 KG/M2 | HEIGHT: 66 IN

## 2021-01-01 PROCEDURE — 25000003 PHARM REV CODE 250: Performed by: EMERGENCY MEDICINE

## 2021-01-01 RX ADMIN — BACITRACIN ZINC, POLYMYXIN B SULFATE, NEOMYCIN SULFATE 2 EACH: 400; 5000; 3.5 OINTMENT TOPICAL at 12:01

## 2021-01-01 RX ADMIN — BACITRACIN ZINC, POLYMYXIN B SULFATE, NEOMYCIN SULFATE 6 EACH: 400; 5000; 3.5 OINTMENT TOPICAL at 12:01

## 2021-02-17 ENCOUNTER — HOSPITAL ENCOUNTER (EMERGENCY)
Facility: HOSPITAL | Age: 41
Discharge: HOME | End: 2021-02-17
Attending: EMERGENCY MEDICINE
Payer: MEDICAID

## 2021-02-17 VITALS
WEIGHT: 186.81 LBS | HEIGHT: 66 IN | BODY MASS INDEX: 30.02 KG/M2 | DIASTOLIC BLOOD PRESSURE: 62 MMHG | RESPIRATION RATE: 20 BRPM | OXYGEN SATURATION: 100 % | HEART RATE: 107 BPM | SYSTOLIC BLOOD PRESSURE: 126 MMHG | TEMPERATURE: 98 F

## 2021-02-17 DIAGNOSIS — R53.1 WEAKNESS: ICD-10-CM

## 2021-02-17 DIAGNOSIS — G89.18 POST-OP PAIN: Primary | ICD-10-CM

## 2021-02-17 LAB
ALBUMIN SERPL BCP-MCNC: 2.7 G/DL (ref 3.5–5.2)
ALP SERPL-CCNC: 69 U/L (ref 55–135)
ALT SERPL W/O P-5'-P-CCNC: 14 U/L (ref 10–44)
ANION GAP SERPL CALC-SCNC: 9 MMOL/L (ref 8–16)
AST SERPL-CCNC: 19 U/L (ref 10–40)
BASOPHILS # BLD AUTO: 0.04 K/UL (ref 0–0.2)
BASOPHILS NFR BLD: 0.9 % (ref 0–1.9)
BILIRUB SERPL-MCNC: 0.4 MG/DL (ref 0.1–1)
BUN SERPL-MCNC: 9 MG/DL (ref 6–20)
CALCIUM SERPL-MCNC: 8.6 MG/DL (ref 8.7–10.5)
CHLORIDE SERPL-SCNC: 98 MMOL/L (ref 95–110)
CO2 SERPL-SCNC: 29 MMOL/L (ref 23–29)
CREAT SERPL-MCNC: 1.1 MG/DL (ref 0.5–1.4)
DIFFERENTIAL METHOD: ABNORMAL
EOSINOPHIL # BLD AUTO: 0.1 K/UL (ref 0–0.5)
EOSINOPHIL NFR BLD: 1.8 % (ref 0–8)
ERYTHROCYTE [DISTWIDTH] IN BLOOD BY AUTOMATED COUNT: 14.2 % (ref 11.5–14.5)
EST. GFR  (AFRICAN AMERICAN): >60 ML/MIN/1.73 M^2
EST. GFR  (NON AFRICAN AMERICAN): >60 ML/MIN/1.73 M^2
GLUCOSE SERPL-MCNC: 97 MG/DL (ref 70–110)
HCT VFR BLD AUTO: 38.4 % (ref 37–48.5)
HGB BLD-MCNC: 12.6 G/DL (ref 12–16)
IMM GRANULOCYTES # BLD AUTO: 0.01 K/UL (ref 0–0.04)
IMM GRANULOCYTES NFR BLD AUTO: 0.2 % (ref 0–0.5)
LYMPHOCYTES # BLD AUTO: 2 K/UL (ref 1–4.8)
LYMPHOCYTES NFR BLD: 43.9 % (ref 18–48)
MCH RBC QN AUTO: 28 PG (ref 27–31)
MCHC RBC AUTO-ENTMCNC: 32.8 G/DL (ref 32–36)
MCV RBC AUTO: 85 FL (ref 82–98)
MONOCYTES # BLD AUTO: 0.4 K/UL (ref 0.3–1)
MONOCYTES NFR BLD: 8.2 % (ref 4–15)
NEUTROPHILS # BLD AUTO: 2 K/UL (ref 1.8–7.7)
NEUTROPHILS NFR BLD: 45 % (ref 38–73)
NRBC BLD-RTO: 0 /100 WBC
PLATELET # BLD AUTO: 437 K/UL (ref 150–350)
PMV BLD AUTO: 9.2 FL (ref 9.2–12.9)
POTASSIUM SERPL-SCNC: 3.5 MMOL/L (ref 3.5–5.1)
PROT SERPL-MCNC: 7.8 G/DL (ref 6–8.4)
RBC # BLD AUTO: 4.5 M/UL (ref 4–5.4)
SODIUM SERPL-SCNC: 136 MMOL/L (ref 136–145)
WBC # BLD AUTO: 4.53 K/UL (ref 3.9–12.7)

## 2021-02-17 PROCEDURE — 80053 COMPREHEN METABOLIC PANEL: CPT

## 2021-02-17 PROCEDURE — 96372 THER/PROPH/DIAG INJ SC/IM: CPT

## 2021-02-17 PROCEDURE — 36415 COLL VENOUS BLD VENIPUNCTURE: CPT

## 2021-02-17 PROCEDURE — 85025 COMPLETE CBC W/AUTO DIFF WBC: CPT

## 2021-02-17 PROCEDURE — 25000003 PHARM REV CODE 250: Performed by: EMERGENCY MEDICINE

## 2021-02-17 PROCEDURE — 63600175 PHARM REV CODE 636 W HCPCS: Performed by: EMERGENCY MEDICINE

## 2021-02-17 PROCEDURE — 99284 EMERGENCY DEPT VISIT MOD MDM: CPT | Mod: 25

## 2021-02-17 RX ORDER — ONDANSETRON 4 MG/1
8 TABLET, ORALLY DISINTEGRATING ORAL
Status: COMPLETED | OUTPATIENT
Start: 2021-02-17 | End: 2021-02-17

## 2021-02-17 RX ORDER — HYDROMORPHONE HYDROCHLORIDE 1 MG/ML
1 INJECTION, SOLUTION INTRAMUSCULAR; INTRAVENOUS; SUBCUTANEOUS
Status: COMPLETED | OUTPATIENT
Start: 2021-02-17 | End: 2021-02-17

## 2021-02-17 RX ADMIN — HYDROMORPHONE HYDROCHLORIDE 1 MG: 1 INJECTION, SOLUTION INTRAMUSCULAR; INTRAVENOUS; SUBCUTANEOUS at 08:02

## 2021-02-17 RX ADMIN — ONDANSETRON 8 MG: 4 TABLET, ORALLY DISINTEGRATING ORAL at 08:02

## 2021-02-22 ENCOUNTER — HOSPITAL ENCOUNTER (EMERGENCY)
Facility: HOSPITAL | Age: 41
Discharge: HOME | End: 2021-02-22
Attending: EMERGENCY MEDICINE
Payer: MEDICAID

## 2021-02-22 VITALS
BODY MASS INDEX: 30.82 KG/M2 | RESPIRATION RATE: 18 BRPM | OXYGEN SATURATION: 99 % | SYSTOLIC BLOOD PRESSURE: 128 MMHG | HEIGHT: 65 IN | HEART RATE: 94 BPM | WEIGHT: 185 LBS | TEMPERATURE: 99 F | DIASTOLIC BLOOD PRESSURE: 81 MMHG

## 2021-02-22 DIAGNOSIS — N30.00 ACUTE CYSTITIS WITHOUT HEMATURIA: Primary | ICD-10-CM

## 2021-02-22 LAB
ALBUMIN SERPL BCP-MCNC: 2.6 G/DL (ref 3.5–5.2)
ALP SERPL-CCNC: 72 U/L (ref 55–135)
ALT SERPL W/O P-5'-P-CCNC: 10 U/L (ref 10–44)
AMPHET+METHAMPHET UR QL: NORMAL
ANION GAP SERPL CALC-SCNC: 7 MMOL/L (ref 8–16)
AST SERPL-CCNC: 10 U/L (ref 10–40)
BACTERIA #/AREA URNS HPF: ABNORMAL /HPF
BARBITURATES UR QL SCN>200 NG/ML: NEGATIVE
BASOPHILS # BLD AUTO: 0.03 K/UL (ref 0–0.2)
BASOPHILS NFR BLD: 0.2 % (ref 0–1.9)
BENZODIAZ UR QL SCN>200 NG/ML: NORMAL
BILIRUB SERPL-MCNC: 0.7 MG/DL (ref 0.1–1)
BILIRUB UR QL STRIP: ABNORMAL
BUN SERPL-MCNC: 9 MG/DL (ref 6–20)
BZE UR QL SCN: NEGATIVE
CALCIUM SERPL-MCNC: 8.8 MG/DL (ref 8.7–10.5)
CANNABINOIDS UR QL SCN: NORMAL
CHLORIDE SERPL-SCNC: 99 MMOL/L (ref 95–110)
CLARITY UR: CLEAR
CO2 SERPL-SCNC: 26 MMOL/L (ref 23–29)
COLOR UR: YELLOW
CREAT SERPL-MCNC: 1 MG/DL (ref 0.5–1.4)
CREAT UR-MCNC: 289 MG/DL (ref 15–325)
DIFFERENTIAL METHOD: ABNORMAL
EOSINOPHIL # BLD AUTO: 0.1 K/UL (ref 0–0.5)
EOSINOPHIL NFR BLD: 0.3 % (ref 0–8)
ERYTHROCYTE [DISTWIDTH] IN BLOOD BY AUTOMATED COUNT: 14.3 % (ref 11.5–14.5)
EST. GFR  (AFRICAN AMERICAN): >60 ML/MIN/1.73 M^2
EST. GFR  (NON AFRICAN AMERICAN): >60 ML/MIN/1.73 M^2
GLUCOSE SERPL-MCNC: 71 MG/DL (ref 70–110)
GLUCOSE UR QL STRIP: NEGATIVE
HCT VFR BLD AUTO: 35.9 % (ref 37–48.5)
HGB BLD-MCNC: 11.9 G/DL (ref 12–16)
HGB UR QL STRIP: ABNORMAL
HYALINE CASTS #/AREA URNS LPF: 12 /LPF
IMM GRANULOCYTES # BLD AUTO: 0.09 K/UL (ref 0–0.04)
IMM GRANULOCYTES NFR BLD AUTO: 0.5 % (ref 0–0.5)
KETONES UR QL STRIP: ABNORMAL
LACTATE SERPL-SCNC: 2.4 MMOL/L (ref 0.5–2.2)
LEUKOCYTE ESTERASE UR QL STRIP: ABNORMAL
LYMPHOCYTES # BLD AUTO: 1.5 K/UL (ref 1–4.8)
LYMPHOCYTES NFR BLD: 8.2 % (ref 18–48)
MCH RBC QN AUTO: 28.3 PG (ref 27–31)
MCHC RBC AUTO-ENTMCNC: 33.1 G/DL (ref 32–36)
MCV RBC AUTO: 85 FL (ref 82–98)
METHADONE UR QL SCN>300 NG/ML: NEGATIVE
MICROSCOPIC COMMENT: ABNORMAL
MONOCYTES # BLD AUTO: 1.4 K/UL (ref 0.3–1)
MONOCYTES NFR BLD: 8.1 % (ref 4–15)
NEUTROPHILS # BLD AUTO: 14.7 K/UL (ref 1.8–7.7)
NEUTROPHILS NFR BLD: 82.7 % (ref 38–73)
NITRITE UR QL STRIP: POSITIVE
NRBC BLD-RTO: 0 /100 WBC
OPIATES UR QL SCN: NORMAL
PCP UR QL SCN>25 NG/ML: NEGATIVE
PH UR STRIP: 6 [PH] (ref 5–8)
PLATELET # BLD AUTO: 517 K/UL (ref 150–350)
PMV BLD AUTO: 9.8 FL (ref 9.2–12.9)
POTASSIUM SERPL-SCNC: 4.9 MMOL/L (ref 3.5–5.1)
PROT SERPL-MCNC: 8 G/DL (ref 6–8.4)
PROT UR QL STRIP: ABNORMAL
RBC # BLD AUTO: 4.21 M/UL (ref 4–5.4)
RBC #/AREA URNS HPF: 1 /HPF (ref 0–4)
SODIUM SERPL-SCNC: 132 MMOL/L (ref 136–145)
SP GR UR STRIP: 1.02 (ref 1–1.03)
SQUAMOUS #/AREA URNS HPF: 6 /HPF
TOXICOLOGY INFORMATION: NORMAL
URN SPEC COLLECT METH UR: ABNORMAL
UROBILINOGEN UR STRIP-ACNC: 1 EU/DL
WBC # BLD AUTO: 17.74 K/UL (ref 3.9–12.7)
WBC #/AREA URNS HPF: 4 /HPF (ref 0–5)

## 2021-02-22 PROCEDURE — 96360 HYDRATION IV INFUSION INIT: CPT

## 2021-02-22 PROCEDURE — 96372 THER/PROPH/DIAG INJ SC/IM: CPT | Mod: 59

## 2021-02-22 PROCEDURE — 80053 COMPREHEN METABOLIC PANEL: CPT

## 2021-02-22 PROCEDURE — 63600175 PHARM REV CODE 636 W HCPCS: Performed by: CLINICAL NURSE SPECIALIST

## 2021-02-22 PROCEDURE — 85025 COMPLETE CBC W/AUTO DIFF WBC: CPT

## 2021-02-22 PROCEDURE — 83605 ASSAY OF LACTIC ACID: CPT

## 2021-02-22 PROCEDURE — 25000003 PHARM REV CODE 250: Performed by: CLINICAL NURSE SPECIALIST

## 2021-02-22 PROCEDURE — 87040 BLOOD CULTURE FOR BACTERIA: CPT

## 2021-02-22 PROCEDURE — 99284 EMERGENCY DEPT VISIT MOD MDM: CPT | Mod: 25

## 2021-02-22 PROCEDURE — 80307 DRUG TEST PRSMV CHEM ANLYZR: CPT

## 2021-02-22 PROCEDURE — 81000 URINALYSIS NONAUTO W/SCOPE: CPT | Mod: 59

## 2021-02-22 PROCEDURE — 36415 COLL VENOUS BLD VENIPUNCTURE: CPT

## 2021-02-22 RX ORDER — CIPROFLOXACIN 500 MG/1
500 TABLET ORAL 2 TIMES DAILY
Qty: 20 TABLET | Refills: 0 | Status: SHIPPED | OUTPATIENT
Start: 2021-02-22 | End: 2021-03-04

## 2021-02-22 RX ORDER — MORPHINE SULFATE 2 MG/ML
2 INJECTION, SOLUTION INTRAMUSCULAR; INTRAVENOUS ONCE
Status: COMPLETED | OUTPATIENT
Start: 2021-02-22 | End: 2021-02-22

## 2021-02-22 RX ADMIN — SODIUM CHLORIDE 1000 ML: 0.9 INJECTION, SOLUTION INTRAVENOUS at 02:02

## 2021-02-22 RX ADMIN — MORPHINE SULFATE 2 MG: 2 INJECTION, SOLUTION INTRAMUSCULAR; INTRAVENOUS at 01:02

## 2021-02-28 LAB — BACTERIA BLD CULT: NORMAL

## 2021-03-08 ENCOUNTER — LAB VISIT (OUTPATIENT)
Dept: LAB | Facility: HOSPITAL | Age: 41
End: 2021-03-08
Attending: NEUROLOGICAL SURGERY
Payer: MEDICAID

## 2021-03-08 DIAGNOSIS — T81.49XA POSTOPERATIVE SUBPHRENIC ABSCESS: ICD-10-CM

## 2021-03-08 DIAGNOSIS — S00.82XA FACE, NECK, AND SCALP EXCEPT EYE, BLISTER, INFECTED: ICD-10-CM

## 2021-03-08 DIAGNOSIS — N17.9 ACUTE KIDNEY FAILURE, UNSPECIFIED: Primary | ICD-10-CM

## 2021-03-08 DIAGNOSIS — L08.9 FACE, NECK, AND SCALP EXCEPT EYE, BLISTER, INFECTED: ICD-10-CM

## 2021-03-08 DIAGNOSIS — S10.92XA FACE, NECK, AND SCALP EXCEPT EYE, BLISTER, INFECTED: ICD-10-CM

## 2021-03-08 DIAGNOSIS — S00.02XA FACE, NECK, AND SCALP EXCEPT EYE, BLISTER, INFECTED: ICD-10-CM

## 2021-03-08 DIAGNOSIS — T81.30XA WOUND DISRUPTION: ICD-10-CM

## 2021-03-08 LAB
ALBUMIN SERPL BCP-MCNC: 1.8 G/DL (ref 3.5–5.2)
ALP SERPL-CCNC: 76 U/L (ref 55–135)
ALT SERPL W/O P-5'-P-CCNC: 8 U/L (ref 10–44)
ANION GAP SERPL CALC-SCNC: 5 MMOL/L (ref 8–16)
AST SERPL-CCNC: 9 U/L (ref 10–40)
BASOPHILS # BLD AUTO: 0.03 K/UL (ref 0–0.2)
BASOPHILS NFR BLD: 0.3 % (ref 0–1.9)
BILIRUB SERPL-MCNC: <0.1 MG/DL (ref 0.1–1)
BUN SERPL-MCNC: 7 MG/DL (ref 6–20)
CALCIUM SERPL-MCNC: 7.8 MG/DL (ref 8.7–10.5)
CHLORIDE SERPL-SCNC: 108 MMOL/L (ref 95–110)
CO2 SERPL-SCNC: 31 MMOL/L (ref 23–29)
CREAT SERPL-MCNC: 0.7 MG/DL (ref 0.5–1.4)
CRP SERPL-MCNC: 0.64 MG/DL (ref 0–0.75)
DIFFERENTIAL METHOD: ABNORMAL
EOSINOPHIL # BLD AUTO: 0.4 K/UL (ref 0–0.5)
EOSINOPHIL NFR BLD: 5 % (ref 0–8)
ERYTHROCYTE [DISTWIDTH] IN BLOOD BY AUTOMATED COUNT: 15.7 % (ref 11.5–14.5)
ERYTHROCYTE [SEDIMENTATION RATE] IN BLOOD: 95 MM/HR (ref 0–20)
EST. GFR  (AFRICAN AMERICAN): >60 ML/MIN/1.73 M^2
EST. GFR  (NON AFRICAN AMERICAN): >60 ML/MIN/1.73 M^2
GLUCOSE SERPL-MCNC: 97 MG/DL (ref 70–110)
HCT VFR BLD AUTO: 25.3 % (ref 37–48.5)
HGB BLD-MCNC: 8.1 G/DL (ref 12–16)
IMM GRANULOCYTES # BLD AUTO: 0.02 K/UL (ref 0–0.04)
IMM GRANULOCYTES NFR BLD AUTO: 0.2 % (ref 0–0.5)
LYMPHOCYTES # BLD AUTO: 1.7 K/UL (ref 1–4.8)
LYMPHOCYTES NFR BLD: 19.7 % (ref 18–48)
MCH RBC QN AUTO: 27.8 PG (ref 27–31)
MCHC RBC AUTO-ENTMCNC: 32 G/DL (ref 32–36)
MCV RBC AUTO: 87 FL (ref 82–98)
MONOCYTES # BLD AUTO: 0.6 K/UL (ref 0.3–1)
MONOCYTES NFR BLD: 7 % (ref 4–15)
NEUTROPHILS # BLD AUTO: 5.9 K/UL (ref 1.8–7.7)
NEUTROPHILS NFR BLD: 67.8 % (ref 38–73)
NRBC BLD-RTO: 0 /100 WBC
PLATELET # BLD AUTO: 429 K/UL (ref 150–350)
PMV BLD AUTO: 10.3 FL (ref 9.2–12.9)
POTASSIUM SERPL-SCNC: 3.9 MMOL/L (ref 3.5–5.1)
PROT SERPL-MCNC: 5.9 G/DL (ref 6–8.4)
RBC # BLD AUTO: 2.91 M/UL (ref 4–5.4)
SODIUM SERPL-SCNC: 144 MMOL/L (ref 136–145)
WBC # BLD AUTO: 8.73 K/UL (ref 3.9–12.7)

## 2021-03-08 PROCEDURE — 80053 COMPREHEN METABOLIC PANEL: CPT | Performed by: NEUROLOGICAL SURGERY

## 2021-03-08 PROCEDURE — 86140 C-REACTIVE PROTEIN: CPT | Performed by: NEUROLOGICAL SURGERY

## 2021-03-08 PROCEDURE — 85025 COMPLETE CBC W/AUTO DIFF WBC: CPT | Performed by: NEUROLOGICAL SURGERY

## 2021-03-08 PROCEDURE — 36415 COLL VENOUS BLD VENIPUNCTURE: CPT | Performed by: NEUROLOGICAL SURGERY

## 2021-03-08 PROCEDURE — 85651 RBC SED RATE NONAUTOMATED: CPT | Performed by: NEUROLOGICAL SURGERY

## 2021-03-12 ENCOUNTER — HOSPITAL ENCOUNTER (EMERGENCY)
Facility: HOSPITAL | Age: 41
Discharge: HOME | End: 2021-03-12
Attending: FAMILY MEDICINE
Payer: MEDICAID

## 2021-03-12 VITALS
RESPIRATION RATE: 18 BRPM | DIASTOLIC BLOOD PRESSURE: 79 MMHG | BODY MASS INDEX: 31.18 KG/M2 | OXYGEN SATURATION: 99 % | WEIGHT: 194 LBS | TEMPERATURE: 100 F | HEART RATE: 101 BPM | SYSTOLIC BLOOD PRESSURE: 125 MMHG | HEIGHT: 66 IN

## 2021-03-12 DIAGNOSIS — Z48.00 DRESSING CHANGE: Primary | ICD-10-CM

## 2021-03-12 PROCEDURE — 99282 EMERGENCY DEPT VISIT SF MDM: CPT

## 2021-03-15 ENCOUNTER — HOSPITAL ENCOUNTER (EMERGENCY)
Facility: HOSPITAL | Age: 41
Discharge: HOME | End: 2021-03-15
Attending: EMERGENCY MEDICINE
Payer: MEDICAID

## 2021-03-15 VITALS
SYSTOLIC BLOOD PRESSURE: 129 MMHG | DIASTOLIC BLOOD PRESSURE: 77 MMHG | OXYGEN SATURATION: 100 % | RESPIRATION RATE: 16 BRPM | WEIGHT: 192 LBS | HEIGHT: 66 IN | TEMPERATURE: 99 F | BODY MASS INDEX: 30.86 KG/M2 | HEART RATE: 87 BPM

## 2021-03-15 DIAGNOSIS — T82.898A OCCLUSION OF PERIPHERALLY INSERTED CENTRAL CATHETER (PICC) LINE, INITIAL ENCOUNTER: Primary | ICD-10-CM

## 2021-03-15 PROCEDURE — 99284 EMERGENCY DEPT VISIT MOD MDM: CPT | Mod: 25

## 2021-03-20 ENCOUNTER — HOSPITAL ENCOUNTER (EMERGENCY)
Facility: HOSPITAL | Age: 41
Discharge: HOME | End: 2021-03-20
Attending: EMERGENCY MEDICINE
Payer: MEDICAID

## 2021-03-20 VITALS
WEIGHT: 193 LBS | HEIGHT: 65 IN | DIASTOLIC BLOOD PRESSURE: 62 MMHG | RESPIRATION RATE: 20 BRPM | OXYGEN SATURATION: 100 % | SYSTOLIC BLOOD PRESSURE: 131 MMHG | HEART RATE: 110 BPM | BODY MASS INDEX: 32.15 KG/M2 | TEMPERATURE: 99 F

## 2021-03-20 DIAGNOSIS — Z78.9 PROBLEM WITH VASCULAR ACCESS: Primary | ICD-10-CM

## 2021-03-20 PROCEDURE — 99282 EMERGENCY DEPT VISIT SF MDM: CPT

## 2021-03-20 RX ORDER — HYDROXYZINE PAMOATE 50 MG/1
50 CAPSULE ORAL
COMMUNITY
Start: 2020-06-24

## 2021-03-20 RX ORDER — DULOXETIN HYDROCHLORIDE 30 MG/1
CAPSULE, DELAYED RELEASE ORAL
COMMUNITY
Start: 2020-06-24

## 2021-03-20 RX ORDER — SILVER SULFADIAZINE 10 G/1000G
CREAM TOPICAL
COMMUNITY
Start: 2021-02-12 | End: 2022-02-12

## 2021-03-20 RX ORDER — CEFAZOLIN SODIUM 10 G/1
INJECTION, POWDER, FOR SOLUTION INTRAVENOUS
COMMUNITY
Start: 2021-03-18 | End: 2024-03-27

## 2021-03-20 RX ORDER — RIFAMPIN 300 MG/1
600 CAPSULE ORAL
COMMUNITY
Start: 2021-03-04 | End: 2021-05-03

## 2021-03-20 RX ORDER — DOXYCYCLINE 100 MG/1
100 CAPSULE ORAL 2 TIMES DAILY
COMMUNITY
Start: 2021-03-17 | End: 2024-03-27

## 2021-03-20 RX ORDER — HYDROXYZINE PAMOATE 50 MG/1
CAPSULE ORAL
COMMUNITY
Start: 2020-06-24 | End: 2021-03-20 | Stop reason: SDUPTHER

## 2021-03-20 RX ORDER — OXYCODONE AND ACETAMINOPHEN 10; 325 MG/1; MG/1
1 TABLET ORAL 4 TIMES DAILY PRN
COMMUNITY
Start: 2021-03-17 | End: 2024-03-27

## 2021-03-22 ENCOUNTER — LAB VISIT (OUTPATIENT)
Dept: LAB | Facility: HOSPITAL | Age: 41
End: 2021-03-22
Attending: NEUROLOGICAL SURGERY
Payer: MEDICAID

## 2021-03-22 DIAGNOSIS — A49.01 BACTERIAL INFECTION DUE TO STAPHYLOCOCCUS AUREUS: ICD-10-CM

## 2021-03-22 DIAGNOSIS — T81.49XA POSTOPERATIVE SUBPHRENIC ABSCESS: Primary | ICD-10-CM

## 2021-03-22 DIAGNOSIS — M48.061 SPINAL STENOSIS, LUMBAR REGION, WITHOUT NEUROGENIC CLAUDICATION: ICD-10-CM

## 2021-03-22 DIAGNOSIS — T81.30XA WOUND DISRUPTION: ICD-10-CM

## 2021-03-22 LAB
ALBUMIN SERPL BCP-MCNC: 2.5 G/DL (ref 3.5–5.2)
ALP SERPL-CCNC: 63 U/L (ref 55–135)
ALT SERPL W/O P-5'-P-CCNC: 12 U/L (ref 10–44)
ANION GAP SERPL CALC-SCNC: 4 MMOL/L (ref 8–16)
AST SERPL-CCNC: 14 U/L (ref 10–40)
BASOPHILS # BLD AUTO: 0.04 K/UL (ref 0–0.2)
BASOPHILS NFR BLD: 1 % (ref 0–1.9)
BILIRUB SERPL-MCNC: 0.2 MG/DL (ref 0.1–1)
BUN SERPL-MCNC: 5 MG/DL (ref 6–20)
CALCIUM SERPL-MCNC: 8.2 MG/DL (ref 8.7–10.5)
CHLORIDE SERPL-SCNC: 109 MMOL/L (ref 95–110)
CO2 SERPL-SCNC: 29 MMOL/L (ref 23–29)
CREAT SERPL-MCNC: 0.6 MG/DL (ref 0.5–1.4)
CRP SERPL-MCNC: 0.35 MG/DL (ref 0–0.75)
DIFFERENTIAL METHOD: ABNORMAL
EOSINOPHIL # BLD AUTO: 0.4 K/UL (ref 0–0.5)
EOSINOPHIL NFR BLD: 10 % (ref 0–8)
ERYTHROCYTE [DISTWIDTH] IN BLOOD BY AUTOMATED COUNT: 16.1 % (ref 11.5–14.5)
ERYTHROCYTE [SEDIMENTATION RATE] IN BLOOD: 50 MM/HR (ref 0–20)
EST. GFR  (AFRICAN AMERICAN): >60 ML/MIN/1.73 M^2
EST. GFR  (NON AFRICAN AMERICAN): >60 ML/MIN/1.73 M^2
GLUCOSE SERPL-MCNC: 90 MG/DL (ref 70–110)
HCT VFR BLD AUTO: 33.9 % (ref 37–48.5)
HGB BLD-MCNC: 10.8 G/DL (ref 12–16)
IMM GRANULOCYTES # BLD AUTO: 0.01 K/UL (ref 0–0.04)
IMM GRANULOCYTES NFR BLD AUTO: 0.3 % (ref 0–0.5)
LYMPHOCYTES # BLD AUTO: 1.4 K/UL (ref 1–4.8)
LYMPHOCYTES NFR BLD: 35.6 % (ref 18–48)
MCH RBC QN AUTO: 27.6 PG (ref 27–31)
MCHC RBC AUTO-ENTMCNC: 31.9 G/DL (ref 32–36)
MCV RBC AUTO: 87 FL (ref 82–98)
MONOCYTES # BLD AUTO: 0.3 K/UL (ref 0.3–1)
MONOCYTES NFR BLD: 8.5 % (ref 4–15)
NEUTROPHILS # BLD AUTO: 1.8 K/UL (ref 1.8–7.7)
NEUTROPHILS NFR BLD: 44.6 % (ref 38–73)
NRBC BLD-RTO: 0 /100 WBC
PLATELET # BLD AUTO: 347 K/UL (ref 150–350)
PMV BLD AUTO: 10.8 FL (ref 9.2–12.9)
POTASSIUM SERPL-SCNC: 4.4 MMOL/L (ref 3.5–5.1)
PROT SERPL-MCNC: 6.3 G/DL (ref 6–8.4)
RBC # BLD AUTO: 3.91 M/UL (ref 4–5.4)
SODIUM SERPL-SCNC: 142 MMOL/L (ref 136–145)
WBC # BLD AUTO: 3.99 K/UL (ref 3.9–12.7)

## 2021-03-22 PROCEDURE — 85651 RBC SED RATE NONAUTOMATED: CPT | Performed by: NEUROLOGICAL SURGERY

## 2021-03-22 PROCEDURE — 86140 C-REACTIVE PROTEIN: CPT | Performed by: NEUROLOGICAL SURGERY

## 2021-03-22 PROCEDURE — 36415 COLL VENOUS BLD VENIPUNCTURE: CPT | Performed by: NEUROLOGICAL SURGERY

## 2021-03-22 PROCEDURE — 80053 COMPREHEN METABOLIC PANEL: CPT | Performed by: NEUROLOGICAL SURGERY

## 2021-03-22 PROCEDURE — 85025 COMPLETE CBC W/AUTO DIFF WBC: CPT | Performed by: NEUROLOGICAL SURGERY

## 2021-03-25 ENCOUNTER — HOSPITAL ENCOUNTER (EMERGENCY)
Facility: HOSPITAL | Age: 41
Discharge: HOME | End: 2021-03-25
Attending: EMERGENCY MEDICINE
Payer: MEDICAID

## 2021-03-25 VITALS
BODY MASS INDEX: 31.18 KG/M2 | WEIGHT: 194 LBS | HEART RATE: 93 BPM | TEMPERATURE: 98 F | RESPIRATION RATE: 18 BRPM | SYSTOLIC BLOOD PRESSURE: 120 MMHG | HEIGHT: 66 IN | OXYGEN SATURATION: 100 % | DIASTOLIC BLOOD PRESSURE: 73 MMHG

## 2021-03-25 DIAGNOSIS — Z78.9 PROBLEM WITH VASCULAR ACCESS: Primary | ICD-10-CM

## 2021-03-25 DIAGNOSIS — H10.9 CONJUNCTIVITIS OF LEFT EYE, UNSPECIFIED CONJUNCTIVITIS TYPE: ICD-10-CM

## 2021-03-25 PROCEDURE — 87070 CULTURE OTHR SPECIMN AEROBIC: CPT | Performed by: EMERGENCY MEDICINE

## 2021-03-25 PROCEDURE — 99283 EMERGENCY DEPT VISIT LOW MDM: CPT

## 2021-03-25 PROCEDURE — 87077 CULTURE AEROBIC IDENTIFY: CPT | Performed by: EMERGENCY MEDICINE

## 2021-03-25 PROCEDURE — 87186 SC STD MICRODIL/AGAR DIL: CPT | Performed by: EMERGENCY MEDICINE

## 2021-03-25 RX ORDER — TOBRAMYCIN 3 MG/ML
2 SOLUTION/ DROPS OPHTHALMIC EVERY 6 HOURS
Qty: 1 BOTTLE | Refills: 0 | Status: SHIPPED | OUTPATIENT
Start: 2021-03-25 | End: 2021-04-01

## 2021-03-29 LAB — BACTERIA CATH TIP CULT: ABNORMAL

## 2021-04-12 ENCOUNTER — LAB VISIT (OUTPATIENT)
Dept: LAB | Facility: HOSPITAL | Age: 41
End: 2021-04-12
Attending: NEUROLOGICAL SURGERY
Payer: MEDICAID

## 2021-04-12 DIAGNOSIS — T81.49XA POSTOPERATIVE SUBPHRENIC ABSCESS: ICD-10-CM

## 2021-04-12 DIAGNOSIS — A49.01 BACTERIAL INFECTION DUE TO STAPHYLOCOCCUS AUREUS: ICD-10-CM

## 2021-04-12 DIAGNOSIS — T81.30XA WOUND DISRUPTION: Primary | ICD-10-CM

## 2021-04-12 LAB
ALBUMIN SERPL BCP-MCNC: 2.4 G/DL (ref 3.5–5.2)
ALP SERPL-CCNC: 56 U/L (ref 55–135)
ALT SERPL W/O P-5'-P-CCNC: 8 U/L (ref 10–44)
ANION GAP SERPL CALC-SCNC: 0 MMOL/L (ref 8–16)
AST SERPL-CCNC: 9 U/L (ref 10–40)
BASOPHILS # BLD AUTO: 0.02 K/UL (ref 0–0.2)
BASOPHILS NFR BLD: 0.6 % (ref 0–1.9)
BILIRUB SERPL-MCNC: 0.2 MG/DL (ref 0.1–1)
BUN SERPL-MCNC: 6 MG/DL (ref 6–20)
CALCIUM SERPL-MCNC: 7.9 MG/DL (ref 8.7–10.5)
CHLORIDE SERPL-SCNC: 108 MMOL/L (ref 95–110)
CO2 SERPL-SCNC: 30 MMOL/L (ref 23–29)
CREAT SERPL-MCNC: 0.7 MG/DL (ref 0.5–1.4)
CRP SERPL-MCNC: <0.29 MG/DL (ref 0–0.75)
DIFFERENTIAL METHOD: ABNORMAL
EOSINOPHIL # BLD AUTO: 0.4 K/UL (ref 0–0.5)
EOSINOPHIL NFR BLD: 13.2 % (ref 0–8)
ERYTHROCYTE [DISTWIDTH] IN BLOOD BY AUTOMATED COUNT: 15.7 % (ref 11.5–14.5)
ERYTHROCYTE [SEDIMENTATION RATE] IN BLOOD: 35 MM/HR (ref 0–20)
EST. GFR  (AFRICAN AMERICAN): >60 ML/MIN/1.73 M^2
EST. GFR  (NON AFRICAN AMERICAN): >60 ML/MIN/1.73 M^2
GLUCOSE SERPL-MCNC: 84 MG/DL (ref 70–110)
HCT VFR BLD AUTO: 33.2 % (ref 37–48.5)
HGB BLD-MCNC: 10.7 G/DL (ref 12–16)
IMM GRANULOCYTES # BLD AUTO: 0.01 K/UL (ref 0–0.04)
IMM GRANULOCYTES NFR BLD AUTO: 0.3 % (ref 0–0.5)
LYMPHOCYTES # BLD AUTO: 1.3 K/UL (ref 1–4.8)
LYMPHOCYTES NFR BLD: 40.1 % (ref 18–48)
MCH RBC QN AUTO: 27.5 PG (ref 27–31)
MCHC RBC AUTO-ENTMCNC: 32.2 G/DL (ref 32–36)
MCV RBC AUTO: 85 FL (ref 82–98)
MONOCYTES # BLD AUTO: 0.4 K/UL (ref 0.3–1)
MONOCYTES NFR BLD: 10.8 % (ref 4–15)
NEUTROPHILS # BLD AUTO: 1.2 K/UL (ref 1.8–7.7)
NEUTROPHILS NFR BLD: 35 % (ref 38–73)
NRBC BLD-RTO: 0 /100 WBC
PLATELET # BLD AUTO: 262 K/UL (ref 150–450)
PMV BLD AUTO: 10.2 FL (ref 9.2–12.9)
POTASSIUM SERPL-SCNC: 3.7 MMOL/L (ref 3.5–5.1)
PROT SERPL-MCNC: 6.5 G/DL (ref 6–8.4)
RBC # BLD AUTO: 3.89 M/UL (ref 4–5.4)
SODIUM SERPL-SCNC: 138 MMOL/L (ref 136–145)
WBC # BLD AUTO: 3.34 K/UL (ref 3.9–12.7)

## 2021-04-12 PROCEDURE — 85025 COMPLETE CBC W/AUTO DIFF WBC: CPT | Performed by: NEUROLOGICAL SURGERY

## 2021-04-12 PROCEDURE — 80053 COMPREHEN METABOLIC PANEL: CPT | Performed by: NEUROLOGICAL SURGERY

## 2021-04-12 PROCEDURE — 86140 C-REACTIVE PROTEIN: CPT | Performed by: NEUROLOGICAL SURGERY

## 2021-04-12 PROCEDURE — 36415 COLL VENOUS BLD VENIPUNCTURE: CPT | Performed by: NEUROLOGICAL SURGERY

## 2021-04-12 PROCEDURE — 85651 RBC SED RATE NONAUTOMATED: CPT | Performed by: NEUROLOGICAL SURGERY

## 2021-04-19 ENCOUNTER — LAB VISIT (OUTPATIENT)
Dept: LAB | Facility: HOSPITAL | Age: 41
End: 2021-04-19
Attending: NEUROLOGICAL SURGERY
Payer: MEDICAID

## 2021-04-19 DIAGNOSIS — A49.01 BACTERIAL INFECTION DUE TO STAPHYLOCOCCUS AUREUS: ICD-10-CM

## 2021-04-19 DIAGNOSIS — T81.30XA WOUND DISRUPTION: Primary | ICD-10-CM

## 2021-04-19 DIAGNOSIS — T81.49XA POSTOPERATIVE SUBPHRENIC ABSCESS: ICD-10-CM

## 2021-04-19 LAB
ALBUMIN SERPL BCP-MCNC: 2.5 G/DL (ref 3.5–5.2)
ALP SERPL-CCNC: 54 U/L (ref 55–135)
ALT SERPL W/O P-5'-P-CCNC: 9 U/L (ref 10–44)
ANION GAP SERPL CALC-SCNC: 5 MMOL/L (ref 8–16)
AST SERPL-CCNC: 12 U/L (ref 10–40)
BASOPHILS # BLD AUTO: 0.03 K/UL (ref 0–0.2)
BASOPHILS NFR BLD: 0.9 % (ref 0–1.9)
BILIRUB SERPL-MCNC: <0.1 MG/DL (ref 0.1–1)
BUN SERPL-MCNC: 7 MG/DL (ref 6–20)
CALCIUM SERPL-MCNC: 8 MG/DL (ref 8.7–10.5)
CHLORIDE SERPL-SCNC: 112 MMOL/L (ref 95–110)
CO2 SERPL-SCNC: 26 MMOL/L (ref 23–29)
CREAT SERPL-MCNC: 0.7 MG/DL (ref 0.5–1.4)
CRP SERPL-MCNC: <0.29 MG/DL (ref 0–0.75)
DIFFERENTIAL METHOD: ABNORMAL
EOSINOPHIL # BLD AUTO: 0.4 K/UL (ref 0–0.5)
EOSINOPHIL NFR BLD: 11.9 % (ref 0–8)
ERYTHROCYTE [DISTWIDTH] IN BLOOD BY AUTOMATED COUNT: 15.8 % (ref 11.5–14.5)
ERYTHROCYTE [SEDIMENTATION RATE] IN BLOOD: 27 MM/HR (ref 0–20)
EST. GFR  (AFRICAN AMERICAN): >60 ML/MIN/1.73 M^2
EST. GFR  (NON AFRICAN AMERICAN): >60 ML/MIN/1.73 M^2
GLUCOSE SERPL-MCNC: 93 MG/DL (ref 70–110)
HCT VFR BLD AUTO: 31.6 % (ref 37–48.5)
HGB BLD-MCNC: 10 G/DL (ref 12–16)
IMM GRANULOCYTES # BLD AUTO: 0 K/UL (ref 0–0.04)
IMM GRANULOCYTES NFR BLD AUTO: 0 % (ref 0–0.5)
LYMPHOCYTES # BLD AUTO: 1.5 K/UL (ref 1–4.8)
LYMPHOCYTES NFR BLD: 43 % (ref 18–48)
MCH RBC QN AUTO: 27.2 PG (ref 27–31)
MCHC RBC AUTO-ENTMCNC: 31.6 G/DL (ref 32–36)
MCV RBC AUTO: 86 FL (ref 82–98)
MONOCYTES # BLD AUTO: 0.4 K/UL (ref 0.3–1)
MONOCYTES NFR BLD: 11.9 % (ref 4–15)
NEUTROPHILS # BLD AUTO: 1.1 K/UL (ref 1.8–7.7)
NEUTROPHILS NFR BLD: 32.3 % (ref 38–73)
NRBC BLD-RTO: 0 /100 WBC
PLATELET # BLD AUTO: 285 K/UL (ref 150–450)
PMV BLD AUTO: 10.7 FL (ref 9.2–12.9)
POTASSIUM SERPL-SCNC: 3.9 MMOL/L (ref 3.5–5.1)
PROT SERPL-MCNC: 6.4 G/DL (ref 6–8.4)
RBC # BLD AUTO: 3.68 M/UL (ref 4–5.4)
SODIUM SERPL-SCNC: 143 MMOL/L (ref 136–145)
WBC # BLD AUTO: 3.44 K/UL (ref 3.9–12.7)

## 2021-04-19 PROCEDURE — 85025 COMPLETE CBC W/AUTO DIFF WBC: CPT | Performed by: NEUROLOGICAL SURGERY

## 2021-04-19 PROCEDURE — 36415 COLL VENOUS BLD VENIPUNCTURE: CPT | Performed by: NEUROLOGICAL SURGERY

## 2021-04-19 PROCEDURE — 80053 COMPREHEN METABOLIC PANEL: CPT | Performed by: NEUROLOGICAL SURGERY

## 2021-04-19 PROCEDURE — 85651 RBC SED RATE NONAUTOMATED: CPT | Performed by: NEUROLOGICAL SURGERY

## 2021-04-19 PROCEDURE — 86140 C-REACTIVE PROTEIN: CPT | Performed by: NEUROLOGICAL SURGERY

## 2021-11-20 ENCOUNTER — HOSPITAL ENCOUNTER (EMERGENCY)
Facility: HOSPITAL | Age: 41
Discharge: HOME | End: 2021-11-20
Attending: STUDENT IN AN ORGANIZED HEALTH CARE EDUCATION/TRAINING PROGRAM
Payer: MEDICARE

## 2021-11-20 VITALS
TEMPERATURE: 98 F | HEART RATE: 89 BPM | BODY MASS INDEX: 33.49 KG/M2 | HEIGHT: 65 IN | RESPIRATION RATE: 14 BRPM | OXYGEN SATURATION: 99 % | SYSTOLIC BLOOD PRESSURE: 136 MMHG | WEIGHT: 201 LBS | DIASTOLIC BLOOD PRESSURE: 80 MMHG

## 2021-11-20 DIAGNOSIS — N30.01 ACUTE CYSTITIS WITH HEMATURIA: Primary | ICD-10-CM

## 2021-11-20 LAB
BACTERIA #/AREA URNS HPF: ABNORMAL /HPF
BILIRUB UR QL STRIP: NEGATIVE
CLARITY UR: ABNORMAL
COLOR UR: YELLOW
GLUCOSE UR QL STRIP: NEGATIVE
HGB UR QL STRIP: ABNORMAL
HYALINE CASTS #/AREA URNS LPF: 5 /LPF
KETONES UR QL STRIP: NEGATIVE
LEUKOCYTE ESTERASE UR QL STRIP: ABNORMAL
MICROSCOPIC COMMENT: ABNORMAL
NITRITE UR QL STRIP: POSITIVE
PH UR STRIP: 6 [PH] (ref 5–8)
PROT UR QL STRIP: NEGATIVE
RBC #/AREA URNS HPF: 7 /HPF (ref 0–4)
SP GR UR STRIP: 1.02 (ref 1–1.03)
SQUAMOUS #/AREA URNS HPF: 7 /HPF
URN SPEC COLLECT METH UR: ABNORMAL
UROBILINOGEN UR STRIP-ACNC: 1 EU/DL
WBC #/AREA URNS HPF: 14 /HPF (ref 0–5)

## 2021-11-20 PROCEDURE — 81000 URINALYSIS NONAUTO W/SCOPE: CPT | Performed by: STUDENT IN AN ORGANIZED HEALTH CARE EDUCATION/TRAINING PROGRAM

## 2021-11-20 PROCEDURE — 87086 URINE CULTURE/COLONY COUNT: CPT | Performed by: STUDENT IN AN ORGANIZED HEALTH CARE EDUCATION/TRAINING PROGRAM

## 2021-11-20 PROCEDURE — 87186 SC STD MICRODIL/AGAR DIL: CPT | Performed by: STUDENT IN AN ORGANIZED HEALTH CARE EDUCATION/TRAINING PROGRAM

## 2021-11-20 PROCEDURE — 87088 URINE BACTERIA CULTURE: CPT | Performed by: STUDENT IN AN ORGANIZED HEALTH CARE EDUCATION/TRAINING PROGRAM

## 2021-11-20 PROCEDURE — 99283 EMERGENCY DEPT VISIT LOW MDM: CPT

## 2021-11-20 PROCEDURE — 25000003 PHARM REV CODE 250: Performed by: NURSE PRACTITIONER

## 2021-11-20 PROCEDURE — 87077 CULTURE AEROBIC IDENTIFY: CPT | Performed by: STUDENT IN AN ORGANIZED HEALTH CARE EDUCATION/TRAINING PROGRAM

## 2021-11-20 RX ORDER — SULFAMETHOXAZOLE AND TRIMETHOPRIM 800; 160 MG/1; MG/1
1 TABLET ORAL 2 TIMES DAILY
Qty: 10 TABLET | Refills: 0 | Status: SHIPPED | OUTPATIENT
Start: 2021-11-20 | End: 2021-11-25

## 2021-11-20 RX ORDER — SULFAMETHOXAZOLE AND TRIMETHOPRIM 800; 160 MG/1; MG/1
1 TABLET ORAL
Status: COMPLETED | OUTPATIENT
Start: 2021-11-20 | End: 2021-11-20

## 2021-11-20 RX ADMIN — SULFAMETHOXAZOLE AND TRIMETHOPRIM 1 TABLET: 800; 160 TABLET ORAL at 09:11

## 2021-11-23 LAB — BACTERIA UR CULT: ABNORMAL

## 2022-04-10 ENCOUNTER — HISTORICAL (OUTPATIENT)
Dept: ADMINISTRATIVE | Facility: HOSPITAL | Age: 42
End: 2022-04-10
Payer: MEDICARE

## 2022-04-29 VITALS
BODY MASS INDEX: 30.71 KG/M2 | WEIGHT: 184.31 LBS | OXYGEN SATURATION: 99 % | HEIGHT: 65 IN | SYSTOLIC BLOOD PRESSURE: 116 MMHG | DIASTOLIC BLOOD PRESSURE: 26 MMHG

## 2023-02-04 ENCOUNTER — HOSPITAL ENCOUNTER (EMERGENCY)
Facility: HOSPITAL | Age: 43
Discharge: HOME | End: 2023-02-04
Attending: EMERGENCY MEDICINE
Payer: MEDICARE

## 2023-02-04 VITALS
OXYGEN SATURATION: 97 % | HEART RATE: 111 BPM | WEIGHT: 217.81 LBS | BODY MASS INDEX: 36.29 KG/M2 | TEMPERATURE: 98 F | RESPIRATION RATE: 18 BRPM | DIASTOLIC BLOOD PRESSURE: 99 MMHG | SYSTOLIC BLOOD PRESSURE: 167 MMHG | HEIGHT: 65 IN

## 2023-02-04 DIAGNOSIS — N30.00 ACUTE CYSTITIS WITHOUT HEMATURIA: Primary | ICD-10-CM

## 2023-02-04 LAB
APPEARANCE UR: ABNORMAL
B-HCG UR QL: NEGATIVE
BACTERIA #/AREA URNS AUTO: ABNORMAL /HPF
BILIRUB UR QL STRIP.AUTO: NEGATIVE MG/DL
COLOR UR AUTO: ABNORMAL
CTP QC/QA: YES
GLUCOSE UR QL STRIP.AUTO: NORMAL MG/DL
HYALINE CASTS #/AREA URNS LPF: ABNORMAL /LPF
KETONES UR QL STRIP.AUTO: ABNORMAL MG/DL
LEUKOCYTE ESTERASE UR QL STRIP.AUTO: 75 UNIT/L
MUCOUS THREADS URNS QL MICRO: ABNORMAL /LPF
NITRITE UR QL STRIP.AUTO: NEGATIVE
PH UR STRIP.AUTO: 6 [PH]
PROT UR QL STRIP.AUTO: NEGATIVE MG/DL
RBC #/AREA URNS AUTO: ABNORMAL /HPF
RBC UR QL AUTO: NEGATIVE UNIT/L
SP GR UR STRIP.AUTO: 1.01
SQUAMOUS #/AREA URNS LPF: ABNORMAL /HPF
UROBILINOGEN UR STRIP-ACNC: ABNORMAL MG/DL
WBC #/AREA URNS AUTO: ABNORMAL /HPF

## 2023-02-04 PROCEDURE — 81001 URINALYSIS AUTO W/SCOPE: CPT | Performed by: EMERGENCY MEDICINE

## 2023-02-04 PROCEDURE — 81025 URINE PREGNANCY TEST: CPT | Performed by: EMERGENCY MEDICINE

## 2023-02-04 PROCEDURE — 99283 EMERGENCY DEPT VISIT LOW MDM: CPT

## 2023-02-04 RX ORDER — SULFAMETHOXAZOLE AND TRIMETHOPRIM 800; 160 MG/1; MG/1
1 TABLET ORAL 2 TIMES DAILY
Qty: 14 TABLET | Refills: 0 | Status: SHIPPED | OUTPATIENT
Start: 2023-02-04 | End: 2023-02-04 | Stop reason: SDUPTHER

## 2023-02-04 RX ORDER — SULFAMETHOXAZOLE AND TRIMETHOPRIM 800; 160 MG/1; MG/1
1 TABLET ORAL 2 TIMES DAILY
Qty: 14 TABLET | Refills: 0 | Status: SHIPPED | OUTPATIENT
Start: 2023-02-04 | End: 2023-02-11

## 2023-02-05 NOTE — ED PROVIDER NOTES
Encounter Date: 2/4/2023       History     Chief Complaint   Patient presents with    Urinary Tract Infection     Pt c/o painful urination with foul odor      Mrs. Leti Babb is a 41 yo female who presents with chief complaint UTI. Onset was about a week ago when she began having bladder discomfort that she describes as cramping that has been constant associated with urination and has noticed her urine to have a foul smell. Nothing seems to make it better or worse. She reports it feels like when she has had UTI's in the past. Denies fever or back pain. She states that since breaking her back she has had difficulty with urination and was performing self-catheterization up until September when her insurance denied her any more catheters. Now she has to force herself to urinate. Denies vaginal bleeding or discharge.    The history is provided by the patient.   Review of patient's allergies indicates:  No Known Allergies  Past Medical History:   Diagnosis Date    Chronic constipation     Hemorrhoids     History of lumbar surgery 2021    Hx of hernia repair 2021     Past Surgical History:   Procedure Laterality Date    LUMBAR LAMINECTOMY WITH FUSION N/A 5/19/2019    Procedure: LAMINECTOMY, SPINE, LUMBAR, WITH FUSION (L5/S1) MIS laminectomy Microscope, metrix set (do not open) Class A;  Surgeon: Brandon Hardy MD;  Location: [facility] OR [address] [place] [address];  Service: Neurosurgery;  Laterality: N/A;    OVARIAN CYST REMOVAL  10/2019     Family History   Problem Relation Age of Onset    No Known Problems Father     Blindness Mother     Diabetes Mother      Social History     Tobacco Use    Smoking status: Every Day     Packs/day: 0.50     Types: Cigarettes    Smokeless tobacco: Never   Substance Use Topics    Alcohol use: Not Currently    Drug use: Yes     Types: Marijuana     Review of Systems    Physical Exam     Initial Vitals [02/04/23 1854]   BP Pulse Resp Temp SpO2   (!) 167/99 (!) 111 18 98 °F (36.7 °C) 97 %      MAP        --         Physical Exam    Nursing note and vitals reviewed.  Constitutional: Vital signs are normal. She appears well-developed and well-nourished.   HENT:   Head: Normocephalic and atraumatic.   Mouth/Throat: Uvula is midline and mucous membranes are normal.   Eyes: EOM are normal. Pupils are equal, round, and reactive to light.   Neck: Trachea normal. Neck supple.   Cardiovascular:  Regular rhythm and normal pulses.   Tachycardia present.         Pulmonary/Chest: Effort normal and breath sounds normal.   Abdominal: Abdomen is soft. Bowel sounds are normal. There is abdominal tenderness in the suprapubic area. There is no rebound and no guarding.   Musculoskeletal:         General: Normal range of motion.      Cervical back: Neck supple.     Neurological: She is alert and oriented to person, place, and time. GCS score is 15. GCS eye subscore is 4. GCS verbal subscore is 5. GCS motor subscore is 6.   Skin: Skin is warm and dry.   Psychiatric: She has a normal mood and affect. Her speech is normal. Thought content normal.       ED Course   Procedures  Labs Reviewed   URINALYSIS, REFLEX TO URINE CULTURE - Abnormal; Notable for the following components:       Result Value    Appearance, UA Turbid (*)     Ketones, UA Trace (*)     Urobilinogen, UA 1+ (*)     Leukocyte Esterase, UA 75 (*)     Bacteria, UA Few (*)     Squamous Epithelial Cells, UA Occ (*)     Mucous, UA Trace (*)     All other components within normal limits   POCT URINE PREGNANCY          Imaging Results    None          Medications - No data to display  Medical Decision Making:   History:   Old Medical Records: I decided to obtain old medical records.  Initial Assessment:   43 yo female presents with bladder discomfort that she states feels similar to UTI's she has had in the past. Urine does shows leukocyte esterase with bacteria. Review of previous urine cultures show pansensitive Ecoli so will start her on Bactrim DS BID x7 days. She is  requesting referral for PCP here so will provide her with referral for internal medicine. Given strict ED return precautions. I have spoken with the patient and/or caregivers. I have explained the patient's condition, diagnoses and treatment plan based on the information available to me at this time. I have answered the patient's and/or caregiver's questions and addressed any concerns. The patient and/or caregivers have as good an understanding of the patient's diagnosis, condition and treatment plan as can be expected at this point. The vital signs have been stable. The patient's condition is stable and appropriate for discharge from the emergency department.    The patient will pursue further outpatient evaluation with the primary care physician or other designated or consulting physician as outlined in the discharge instructions. The patient and/or caregivers are agreeable to this plan of care and follow-up instructions have been explained in detail. The patient and/or caregivers have received these instructions in written format and have expressed an understanding of the discharge instructions. The patient and/or caregivers are aware that any significant change in condition or worsening of symptoms should prompt an immediate return to this or the closest emergency department or a call to 911.   Clinical Tests:   Lab Tests: Ordered and Reviewed                        Clinical Impression:   Final diagnoses:  [N30.00] Acute cystitis without hematuria (Primary)        ED Disposition Condition    Discharge Stable          ED Prescriptions       Medication Sig Dispense Start Date End Date Auth. Provider    sulfamethoxazole-trimethoprim 800-160mg (BACTRIM DS) 800-160 mg Tab  (Status: Discontinued) Take 1 tablet by mouth 2 (two) times daily. for 7 days 14 tablet 2/4/2023 2/4/2023 Héctor Smith DO    sulfamethoxazole-trimethoprim 800-160mg (BACTRIM DS) 800-160 mg Tab Take 1 tablet by mouth 2 (two) times daily. for 7  days 14 tablet 2/4/2023 2/11/2023 Héctor Smith DO          Follow-up Information       Follow up With Specialties Details Why Contact Info    Winifred Ryan MD Internal Medicine Schedule an appointment as soon as possible for a visit   [address] [address]  [phone]               Héctor Smith DO  02/04/23 1941

## 2024-03-27 ENCOUNTER — HOSPITAL ENCOUNTER (EMERGENCY)
Facility: HOSPITAL | Age: 44
Discharge: HOME | End: 2024-03-27
Attending: INTERNAL MEDICINE
Payer: MEDICARE

## 2024-03-27 VITALS
OXYGEN SATURATION: 98 % | HEART RATE: 95 BPM | TEMPERATURE: 99 F | SYSTOLIC BLOOD PRESSURE: 110 MMHG | DIASTOLIC BLOOD PRESSURE: 78 MMHG | HEIGHT: 66 IN | BODY MASS INDEX: 34.72 KG/M2 | WEIGHT: 216.06 LBS | RESPIRATION RATE: 17 BRPM

## 2024-03-27 DIAGNOSIS — L02.91 ABSCESS: Primary | ICD-10-CM

## 2024-03-27 LAB
B-HCG UR QL: NEGATIVE
CTP QC/QA: YES

## 2024-03-27 PROCEDURE — 99284 EMERGENCY DEPT VISIT MOD MDM: CPT | Mod: 25

## 2024-03-27 PROCEDURE — 63600175 PHARM REV CODE 636 W HCPCS: Performed by: PHYSICIAN ASSISTANT

## 2024-03-27 PROCEDURE — 96372 THER/PROPH/DIAG INJ SC/IM: CPT | Mod: 59 | Performed by: PHYSICIAN ASSISTANT

## 2024-03-27 PROCEDURE — 81025 URINE PREGNANCY TEST: CPT | Performed by: PHYSICIAN ASSISTANT

## 2024-03-27 PROCEDURE — 25000003 PHARM REV CODE 250: Performed by: PHYSICIAN ASSISTANT

## 2024-03-27 PROCEDURE — 10060 I&D ABSCESS SIMPLE/SINGLE: CPT

## 2024-03-27 RX ORDER — LIDOCAINE HYDROCHLORIDE 10 MG/ML
5 INJECTION, SOLUTION EPIDURAL; INFILTRATION; INTRACAUDAL; PERINEURAL
Status: COMPLETED | OUTPATIENT
Start: 2024-03-27 | End: 2024-03-27

## 2024-03-27 RX ORDER — KETOROLAC TROMETHAMINE 30 MG/ML
30 INJECTION, SOLUTION INTRAMUSCULAR; INTRAVENOUS
Status: COMPLETED | OUTPATIENT
Start: 2024-03-27 | End: 2024-03-27

## 2024-03-27 RX ORDER — HYDROCODONE BITARTRATE AND ACETAMINOPHEN 5; 325 MG/1; MG/1
1 TABLET ORAL EVERY 8 HOURS PRN
Qty: 12 TABLET | Refills: 0 | Status: SHIPPED | OUTPATIENT
Start: 2024-03-27 | End: 2024-04-01

## 2024-03-27 RX ORDER — SULFAMETHOXAZOLE AND TRIMETHOPRIM 800; 160 MG/1; MG/1
1 TABLET ORAL 2 TIMES DAILY
Qty: 14 TABLET | Refills: 0 | Status: SHIPPED | OUTPATIENT
Start: 2024-03-27 | End: 2024-04-03

## 2024-03-27 RX ORDER — HYDROCODONE BITARTRATE AND ACETAMINOPHEN 7.5; 325 MG/1; MG/1
1 TABLET ORAL ONCE
Status: COMPLETED | OUTPATIENT
Start: 2024-03-27 | End: 2024-03-27

## 2024-03-27 RX ADMIN — HYDROCODONE BITARTRATE AND ACETAMINOPHEN 1 TABLET: 7.5; 325 TABLET ORAL at 06:03

## 2024-03-27 RX ADMIN — LIDOCAINE HYDROCHLORIDE 50 MG: 10 INJECTION, SOLUTION EPIDURAL; INFILTRATION; INTRACAUDAL; PERINEURAL at 07:03

## 2024-03-27 RX ADMIN — KETOROLAC TROMETHAMINE 30 MG: 30 INJECTION, SOLUTION INTRAMUSCULAR; INTRAVENOUS at 06:03

## 2024-03-27 NOTE — ED PROVIDER NOTES
Encounter Date: 3/27/2024       History     Chief Complaint   Patient presents with    Abscess     Pt reports an abscess under the right arm     Patient reports to the emergency room with a abscess under the right arm; patient denies fever     The history is provided by the patient.   Abscess   This is a new problem. The current episode started two days ago. The abscess is present on the right arm. The abscess is characterized by redness and painfulness. Pertinent negatives include no fever, no vomiting and no sore throat.     Review of patient's allergies indicates:  No Known Allergies  Past Medical History:   Diagnosis Date    Chronic constipation     Hemorrhoids     History of lumbar surgery 2021    Hx of hernia repair 2021     Past Surgical History:   Procedure Laterality Date    LUMBAR LAMINECTOMY WITH FUSION N/A 5/19/2019    Procedure: LAMINECTOMY, SPINE, LUMBAR, WITH FUSION (L5/S1) MIS laminectomy Microscope, metrix set (do not open) Class A;  Surgeon: Brandon Hardy MD;  Location: [facility] OR [address] [place] [address];  Service: Neurosurgery;  Laterality: N/A;    OVARIAN CYST REMOVAL  10/2019     Family History   Problem Relation Age of Onset    No Known Problems Father     Blindness Mother     Diabetes Mother      Social History     Tobacco Use    Smoking status: Every Day     Current packs/day: 0.50     Types: Cigarettes    Smokeless tobacco: Never   Substance Use Topics    Alcohol use: Not Currently    Drug use: Yes     Types: Marijuana     Review of Systems   Constitutional:  Negative for fever.   HENT:  Negative for sore throat.    Eyes: Negative.    Respiratory:  Negative for shortness of breath.    Cardiovascular:  Negative for chest pain.   Gastrointestinal:  Negative for nausea and vomiting.   Genitourinary:  Negative for dysuria.   Musculoskeletal:  Negative for back pain.   Skin:  Negative for rash.   Neurological:  Negative for weakness.   Hematological:  Does not bruise/bleed easily.   Psychiatric/Behavioral:  Negative.         Physical Exam     Initial Vitals [03/27/24 1722]   BP Pulse Resp Temp SpO2   104/89 103 18 98.4 °F (36.9 °C) 100 %      MAP       --         Physical Exam    Vitals reviewed.  Constitutional: She appears well-developed and well-nourished.   HENT:   Head: Normocephalic and atraumatic.   Eyes: Conjunctivae and EOM are normal. Pupils are equal, round, and reactive to light.   Neck: Neck supple.   Normal range of motion.  Cardiovascular:  Normal rate, regular rhythm, normal heart sounds and intact distal pulses.           Pulmonary/Chest: Breath sounds normal. No respiratory distress. She has no wheezes. She exhibits no tenderness.   Abdominal: Abdomen is soft. Bowel sounds are normal. There is no abdominal tenderness.   Musculoskeletal:         General: Normal range of motion.      Cervical back: Normal range of motion and neck supple.     Neurological: She is alert and oriented to person, place, and time. She displays normal reflexes. No cranial nerve deficit or sensory deficit.   Skin: Skin is warm and dry.   Psychiatric: She has a normal mood and affect. Her behavior is normal. Judgment and thought content normal.         ED Course   I & D - Incision and Drainage    Date/Time: 3/27/2024 6:12 PM  Location procedure was performed: [facility] EMERGENCY DEPARTMENT    Performed by: Davis Bui PA  Authorized by: Davis Bui PA  Pre-operative diagnosis: abscess  Post-operative diagnosis: abscess  Consent Done: Yes  Consent: Verbal consent obtained.  Risks and benefits: risks, benefits and alternatives were discussed  Consent given by: patient  Patient understanding: patient states understanding of the procedure being performed  Patient consent: the patient's understanding of the procedure matches consent given  Procedure consent: procedure consent matches procedure scheduled  Relevant documents: relevant documents present and verified  Test results: test results available and properly labeled  Site  marked: the operative site was marked  Imaging studies: imaging studies available  Patient identity confirmed: , name, verbally with patient and MRN  Type: abscess  Body area: upper extremity  Location details: right arm  Anesthesia: local infiltration    Anesthesia:  Local Anesthetic: lidocaine 1% without epinephrine  Anesthetic total: 3 mL  Scalpel size: 11  Incision type: single straight  Complexity: simple  Drainage: pus  Drainage amount: moderate  Wound treatment: incision, drainage, expression of material and wound packed  Packing material:  in gauze  Complications: No  Estimated blood loss (mL): 1  Specimens: No  Implants: No  Patient tolerance: Patient tolerated the procedure well with no immediate complications        Labs Reviewed   POCT URINE PREGNANCY          Imaging Results    None          Medications   LIDOcaine (PF) 10 mg/ml (1%) injection 50 mg (has no administration in time range)   ketorolac injection 30 mg (30 mg Intramuscular Given 3/27/24 1829)   HYDROcodone-acetaminophen 7.5-325 mg per tablet 1 tablet (1 tablet Oral Given 3/27/24 1829)     Medical Decision Making  Amount and/or Complexity of Data Reviewed  Labs: ordered.    Risk  Prescription drug management.  Risk Details: Given strict ED return precautions. I have spoken with the patient and/or caregivers. I have explained the patient's condition, diagnoses and treatment plan based on the information available to me at this time. I have answered the patient's and/or caregiver's questions and addressed any concerns. The patient and/or caregivers have as good an understanding of the patient's diagnosis, condition and treatment plan as can be expected at this point. The vital signs have been stable. The patient's condition is stable and appropriate for discharge from the emergency department.      The patient will pursue further outpatient evaluation with the primary care physician or other designated or consulting physician as outlined  in the discharge instructions. The patient and/or caregivers are agreeable to this plan of care and follow-up instructions have been explained in detail. The patient and/or caregivers have received these instructions in written format and have expressed an understanding of the discharge instructions. The patient and/or caregivers are aware that any significant change in condition or worsening of symptoms should prompt an immediate return to this or the closest emergency department or a call to 911.                                      Clinical Impression:  Final diagnoses:  [L02.91] Abscess (Primary)          ED Disposition Condition    Discharge Stable          ED Prescriptions       Medication Sig Dispense Start Date End Date Auth. Provider    HYDROcodone-acetaminophen (NORCO) 5-325 mg per tablet Take 1 tablet by mouth every 8 (eight) hours as needed for Pain. 12 tablet 3/27/2024 4/1/2024 Davis Bui PA    sulfamethoxazole-trimethoprim 800-160mg (BACTRIM DS) 800-160 mg Tab Take 1 tablet by mouth 2 (two) times daily. for 7 days 14 tablet 3/27/2024 4/3/2024 Davis Bui PA          Follow-up Information       Follow up With Specialties Details Why Contact Info    discharge followup  In 2 days For wound re-check If your symptoms become WORSE or you DO NOT IMPROVE and you are unable to reach your health care provider, you should RETURN to the emergency department    discharge info    Discussed all pertinent ED information, results, diagnosis and treatment plan; All questions and concerns were addressed at this time. Patient voices understanding of information and instructions. Patient is comfortable with plan and discharge             Daivs Bui PA  03/27/24 1935

## 2024-03-29 ENCOUNTER — HOSPITAL ENCOUNTER (EMERGENCY)
Facility: HOSPITAL | Age: 44
Discharge: HOME | End: 2024-03-29
Attending: EMERGENCY MEDICINE
Payer: MEDICARE

## 2024-03-29 VITALS
HEIGHT: 66 IN | BODY MASS INDEX: 33.31 KG/M2 | RESPIRATION RATE: 20 BRPM | DIASTOLIC BLOOD PRESSURE: 91 MMHG | HEART RATE: 101 BPM | OXYGEN SATURATION: 100 % | SYSTOLIC BLOOD PRESSURE: 140 MMHG | WEIGHT: 207.25 LBS | TEMPERATURE: 98 F

## 2024-03-29 DIAGNOSIS — Z48.00 ABSCESS PACKING REMOVAL: Primary | ICD-10-CM

## 2024-03-29 PROCEDURE — 99281 EMR DPT VST MAYX REQ PHY/QHP: CPT

## 2024-03-29 NOTE — DISCHARGE INSTRUCTIONS
Clean wound twice daily and apply neosporin.   Return to the [facility] ED immediately for worsening redness, purulent drainage from wound, or fever.   Continue medications as prescribed.  Follow up with your primary care physician in 3-5 days for follow up evaluation.

## 2024-03-29 NOTE — ED PROVIDER NOTES
Encounter Date: 3/29/2024       History     Chief Complaint   Patient presents with    Wound Check     Needs packing removal from I and d of abscess right axilla     Pt is a 44 y.o. female who presents to the [facility] ED for evaluation of her Rt axilla. Seen at the [facility] ED 2 days ago and had an abscess performed to affected site. Reports minimal tenderness to area. Admits to full compliance with prescribed medications. Denies chest pain, SOB, weakness, dizziness, or fever.       Review of patient's allergies indicates:  No Known Allergies  Past Medical History:   Diagnosis Date    Chronic constipation     Hemorrhoids     History of lumbar surgery 2021    Hx of hernia repair 2021     Past Surgical History:   Procedure Laterality Date    LUMBAR LAMINECTOMY WITH FUSION N/A 5/19/2019    Procedure: LAMINECTOMY, SPINE, LUMBAR, WITH FUSION (L5/S1) MIS laminectomy Microscope, metrix set (do not open) Class A;  Surgeon: Brandon Hardy MD;  Location: [facility] OR [address] [place] [address];  Service: Neurosurgery;  Laterality: N/A;    OVARIAN CYST REMOVAL  10/2019     Family History   Problem Relation Age of Onset    No Known Problems Father     Blindness Mother     Diabetes Mother      Social History     Tobacco Use    Smoking status: Every Day     Current packs/day: 0.50     Types: Cigarettes    Smokeless tobacco: Never   Substance Use Topics    Alcohol use: Not Currently    Drug use: Yes     Types: Marijuana     Review of Systems   Constitutional:  Negative for chills, diaphoresis, fatigue and fever.   HENT:  Negative for facial swelling, rhinorrhea, sinus pressure, sinus pain, sore throat and trouble swallowing.    Respiratory:  Negative for cough, chest tightness, shortness of breath and wheezing.    Cardiovascular:  Negative for chest pain, palpitations and leg swelling.   Gastrointestinal:  Negative for abdominal pain, diarrhea, nausea and vomiting.   Genitourinary:  Negative for dysuria, flank pain, frequency, hematuria and urgency.    Musculoskeletal:  Negative for arthralgias, back pain, joint swelling and myalgias.   Skin:  Positive for wound. Negative for color change and rash.   Neurological:  Negative for dizziness, syncope, weakness and light-headedness.   Hematological:  Does not bruise/bleed easily.   All other systems reviewed and are negative.      Physical Exam     Initial Vitals [03/29/24 1423]   BP Pulse Resp Temp SpO2   (!) 140/91 101 20 97.5 °F (36.4 °C) 100 %      MAP       --         Physical Exam    Nursing note and vitals reviewed.  Constitutional: She appears well-developed and well-nourished.   HENT:   Head: Normocephalic and atraumatic.   Nose: Nose normal.   Mouth/Throat: Oropharynx is clear and moist.   Eyes: Conjunctivae and EOM are normal. Pupils are equal, round, and reactive to light.   Neck: Neck supple.   Normal range of motion.  Cardiovascular:  Normal rate, regular rhythm, normal heart sounds and intact distal pulses.           Pulmonary/Chest: Effort normal and breath sounds normal. No respiratory distress. She has no wheezes. She has no rhonchi. She has no rales. She exhibits no tenderness.   Abdominal: Abdomen is soft and flat. Bowel sounds are normal. She exhibits no distension. There is no abdominal tenderness. There is no rebound, no guarding, no tenderness at McBurney's point and negative Mckeon's sign. negative psoas sign  Musculoskeletal:         General: Normal range of motion.      Cervical back: Normal range of motion and neck supple.     Neurological: She is alert and oriented to person, place, and time. She has normal strength and normal reflexes.   Skin: Skin is warm and dry. Capillary refill takes less than 2 seconds.        Psychiatric: She has a normal mood and affect. Her speech is normal and behavior is normal. Judgment and thought content normal.         ED Course   Procedures  Labs Reviewed - No data to display       Imaging Results    None          Medications - No data to display  Medical  Decision Making  Differential:  Encounter for packing removal  Abscess               ED Course as of 03/29/24 1441   Fri Mar 29, 2024   1439 Iodoform removed from wound bed with new dressing applied per OSEAS MIRELES. Affected tissue remains neurovascularly intact post dressing change.  [JA]      ED Course User Index  [JA] Tacos Angela Jr., FNP                           Clinical Impression:  Final diagnoses:  [Z48.00] Abscess packing removal (Primary)          ED Disposition Condition    Discharge Stable          ED Prescriptions    None       Follow-up Information       Follow up With Specialties Details Why Contact Info    [facility] - Emergency Dept Emergency Medicine In 3 days As needed, If symptoms worsen [address] [facility] [address] [phone]             Tacos Angela Jr., FNP  03/29/24 1441

## 2024-12-07 ENCOUNTER — HOSPITAL ENCOUNTER (EMERGENCY)
Facility: HOSPITAL | Age: 44
Discharge: HOME | End: 2024-12-07
Attending: EMERGENCY MEDICINE
Payer: MEDICARE

## 2024-12-07 VITALS
BODY MASS INDEX: 33.27 KG/M2 | SYSTOLIC BLOOD PRESSURE: 138 MMHG | OXYGEN SATURATION: 98 % | DIASTOLIC BLOOD PRESSURE: 87 MMHG | HEART RATE: 75 BPM | TEMPERATURE: 98 F | HEIGHT: 66 IN | RESPIRATION RATE: 20 BRPM | WEIGHT: 207 LBS

## 2024-12-07 DIAGNOSIS — S50.321A: ICD-10-CM

## 2024-12-07 DIAGNOSIS — L02.411 ABSCESS OF RIGHT AXILLA: Primary | ICD-10-CM

## 2024-12-07 LAB
B-HCG UR QL: NEGATIVE
CTP QC/QA: YES

## 2024-12-07 PROCEDURE — 99284 EMERGENCY DEPT VISIT MOD MDM: CPT

## 2024-12-07 PROCEDURE — 81025 URINE PREGNANCY TEST: CPT | Performed by: NURSE PRACTITIONER

## 2024-12-07 RX ORDER — SULFAMETHOXAZOLE AND TRIMETHOPRIM 800; 160 MG/1; MG/1
1 TABLET ORAL 2 TIMES DAILY
Qty: 14 TABLET | Refills: 0 | Status: SHIPPED | OUTPATIENT
Start: 2024-12-07 | End: 2024-12-14

## 2024-12-07 RX ORDER — DICLOFENAC SODIUM 75 MG/1
75 TABLET, DELAYED RELEASE ORAL 2 TIMES DAILY PRN
Qty: 20 TABLET | Refills: 0 | Status: SHIPPED | OUTPATIENT
Start: 2024-12-07

## 2024-12-07 NOTE — ED PROVIDER NOTES
Encounter Date: 12/7/2024       History     Chief Complaint   Patient presents with    Abscess     C/o right axillary abscess and right elbow abscess since yesterday     The patient presents with abscess.  The onset was yesterday.  The course/duration of symptoms is constant.  Location: right axilla. The character of symptoms is pain, redness, swelling and drainage.  The degree of symptoms is minimal.  Risk factors consist of none.  Prior episodes: occasional.  Therapy today: none.  Associated symptoms: none, denies fever and denies chills. She also reports blister to right elbow since yesterday.         Review of patient's allergies indicates:  No Known Allergies  Past Medical History:   Diagnosis Date    Chronic constipation     Hemorrhoids     History of lumbar surgery 2021    Hx of hernia repair 2021     Past Surgical History:   Procedure Laterality Date    LUMBAR LAMINECTOMY WITH FUSION N/A 5/19/2019    Procedure: LAMINECTOMY, SPINE, LUMBAR, WITH FUSION (L5/S1) MIS laminectomy Microscope, metrix set (do not open) Class A;  Surgeon: Brandon Hardy MD;  Location: [facility] OR [address] [place] [address];  Service: Neurosurgery;  Laterality: N/A;    OVARIAN CYST REMOVAL  10/2019     Family History   Problem Relation Name Age of Onset    No Known Problems Father      Blindness Mother      Diabetes Mother       Social History     Tobacco Use    Smoking status: Every Day     Current packs/day: 0.50     Types: Cigarettes    Smokeless tobacco: Never   Substance Use Topics    Alcohol use: Not Currently    Drug use: Yes     Types: Marijuana     Review of Systems   Constitutional:  Negative for fever.   HENT:  Negative for sore throat.    Respiratory:  Negative for shortness of breath.    Cardiovascular:  Negative for chest pain.   Gastrointestinal:  Negative for nausea.   Genitourinary:  Negative for dysuria.   Musculoskeletal:  Negative for back pain.   Skin:  Positive for wound. Negative for rash.   Neurological:  Negative for  weakness.   Hematological:  Does not bruise/bleed easily.   All other systems reviewed and are negative.      Physical Exam     Initial Vitals [12/07/24 1235]   BP Pulse Resp Temp SpO2   138/87 75 20 98.2 °F (36.8 °C) 98 %      MAP       --         Physical Exam    Nursing note and vitals reviewed.  Constitutional: She appears well-developed and well-nourished.   HENT:   Head: Normocephalic and atraumatic.   Neck: Neck supple.   Normal range of motion.  Cardiovascular:  Normal rate, regular rhythm, normal heart sounds and intact distal pulses.           Pulmonary/Chest: Effort normal and breath sounds normal.   Abdominal: Abdomen is soft and flat. Bowel sounds are normal. There is no abdominal tenderness.   Musculoskeletal:         General: Normal range of motion.      Cervical back: Normal range of motion and neck supple.     Neurological: She is alert. She has normal strength.   Skin: Skin is warm and dry.   Small abscess to right axilla - easily expressed small amount purulent material, will not need I&D at this time, small intact blister to right elbow - no erythema or s/s infection   Psychiatric: She has a normal mood and affect.         ED Course   Procedures  Labs Reviewed   POCT URINE PREGNANCY       Result Value    POC Preg Test, Ur Negative       Acceptable Yes            Imaging Results    None          Medications - No data to display  Medical Decision Making  The patient presents with abscess.  The onset was yesterday.  The course/duration of symptoms is constant.  Location: right axilla. The character of symptoms is pain, redness, swelling and drainage.  The degree of symptoms is minimal.  Risk factors consist of none.  Prior episodes: occasional.  Therapy today: none.  Associated symptoms: none, denies fever and denies chills. She also reports blister to right elbow since yesterday.       She was advised to return to the ER in 3 days for a recheck and she will f/u with her pcp.1:05 PM  DISPOSITION: The patient is resting comfortably in no acute distress.  She is hemodynamically stable and is without objective evidence for acute process requiring urgent intervention or hospitalization. I provided counseling to patient with regard to condition, the treatment plan, specific conditions for return, and the importance of follow up. Detailed written and verbal instructions provided to patient and she expressed a verbal understanding of the discharge instructions and overall management plan. Reiterated the importance of medication administration and safety and advised patient to follow up with primary care provider in 3-5 days or sooner if needed.  Answered questions at this time. The patient is stable for discharge.       Amount and/or Complexity of Data Reviewed  Labs: ordered.      Additional MDM:   Differential Diagnosis:   At this time differential diagnosis is but not limited to abscess, soft tissue infection, cellulitis                ED Course as of 12/07/24 1307   Sat Dec 07, 2024   1304 Given strict ED return precautions. I have spoken with the patient and/or caregivers. I have explained the patient's condition, diagnoses and treatment plan based on the information available to me at this time. I have answered the patient's and/or caregiver's questions and addressed any concerns. The patient and/or caregivers have as good an understanding of the patient's diagnosis, condition and treatment plan as can be expected at this point. The vital signs have been stable. The patient's condition is stable and appropriate for discharge from the emergency department.      The patient will pursue further outpatient evaluation with the primary care physician or other designated or consulting physician as outlined in the discharge instructions. The patient and/or caregivers are agreeable to this plan of care and follow-up instructions have been explained in detail. The patient and/or caregivers have received  these instructions in written format and have expressed an understanding of the discharge instructions. The patient and/or caregivers are aware that any significant change in condition or worsening of symptoms should prompt an immediate return to this or the closest emergency department or a call to 911.      [RB]      ED Course User Index  [RB] Bakari Doty ACNP                           Clinical Impression:  Final diagnoses:  [L02.411] Abscess of right axilla (Primary)  [S50.321A] Blister of right elbow without infection, initial encounter          ED Disposition Condition    Discharge Stable          ED Prescriptions       Medication Sig Dispense Start Date End Date Auth. Provider    sulfamethoxazole-trimethoprim 800-160mg (BACTRIM DS) 800-160 mg Tab Take 1 tablet by mouth 2 (two) times daily. for 7 days 14 tablet 12/7/2024 12/14/2024 Bakari Doty ACNP    diclofenac (VOLTAREN) 75 MG EC tablet Take 1 tablet (75 mg total) by mouth 2 (two) times daily as needed (pain). Do not take with ibuprofen, motrin, aleve, naprosyn, or any other NSAID. 20 tablet 12/7/2024 -- Bakari Doty ACNP          Follow-up Information       Follow up With Specialties Details Why Contact Info    follow up with your primary care provider in 3-5 days        [facility] - Emergency Dept Emergency Medicine In 3 days If symptoms worsen, For wound re-check [address] [facility] [id number] [phone]             Bakari Doty ACNP  12/07/24 2058

## (undated) DEVICE — SEE MEDLINE ITEM 157131

## (undated) DEVICE — SEE MEDLINE ITEM 146292

## (undated) DEVICE — TRAY FOLEY 16FR INFECTION CONT

## (undated) DEVICE — SUT VICRYL PLUS 0 CT1 18IN

## (undated) DEVICE — Device

## (undated) DEVICE — DRESSING TRANS 4X4 TEGADERM

## (undated) DEVICE — DRAPE STERI INSTRUMENT 1018

## (undated) DEVICE — SYR ONLY LUER LOCK 20CC

## (undated) DEVICE — CLOSURE SKIN STERI STRIP 1/2X4

## (undated) DEVICE — DRAIN CHANNEL ROUND 15FR

## (undated) DEVICE — CORD BIPOLAR 12 FOOT

## (undated) DEVICE — ELECTRODE REM PLYHSV RETURN 9

## (undated) DEVICE — SPONGE GAUZE 16PLY 4X4

## (undated) DEVICE — DRAIN CHANNEL ROUND 10FR

## (undated) DEVICE — DRESSING TELFA STRL 4X3 LF

## (undated) DEVICE — SEE MEDLINE ITEM 156905

## (undated) DEVICE — SUT D SPECIAL VICRYL 2-0

## (undated) DEVICE — DIFFUSER

## (undated) DEVICE — CARTRIDGE OIL

## (undated) DEVICE — DRAPE INCISE IOBAN 2 23X17IN

## (undated) DEVICE — SUT MONOCRYL 4-0 PS-1 UND

## (undated) DEVICE — DRESSING ABSRBNT ISLAND 3.6X8

## (undated) DEVICE — TUBE FRAZIER 5MM 2FT SOFT TIP

## (undated) DEVICE — BACITRACIN ZINC OINT 0.9GM

## (undated) DEVICE — DRAPE STERI-DRAPE 1000 17X11IN

## (undated) DEVICE — ELECTRODE BLADE INSULATED 1 IN

## (undated) DEVICE — DRAPE C-ARM I18X9X20

## (undated) DEVICE — EVACUATOR WOUND BULB 100CC

## (undated) DEVICE — SEE MEDLINE ITEM 157150

## (undated) DEVICE — SEALER AQUAMANTYS 2.3 BIPOLAR

## (undated) DEVICE — DRAPE ABDOMINAL TIBURON 14X11

## (undated) DEVICE — DRAPE C-ARMOR EQUIPMENT COVER

## (undated) DEVICE — DRESSING AQUACEL FOAM 5 X 5

## (undated) DEVICE — DRESSING MEPILEX BORDER 4 X 4

## (undated) DEVICE — SEE MEDLINE ITEM 157117

## (undated) DEVICE — KIT SPINAL PATIENT CARE JACK